# Patient Record
Sex: FEMALE | Race: WHITE | NOT HISPANIC OR LATINO
[De-identification: names, ages, dates, MRNs, and addresses within clinical notes are randomized per-mention and may not be internally consistent; named-entity substitution may affect disease eponyms.]

---

## 2017-01-05 ENCOUNTER — APPOINTMENT (OUTPATIENT)
Dept: OPHTHALMOLOGY | Facility: CLINIC | Age: 82
End: 2017-01-05

## 2017-02-07 ENCOUNTER — APPOINTMENT (OUTPATIENT)
Dept: OPHTHALMOLOGY | Facility: CLINIC | Age: 82
End: 2017-02-07

## 2017-06-22 ENCOUNTER — APPOINTMENT (OUTPATIENT)
Dept: OPHTHALMOLOGY | Facility: CLINIC | Age: 82
End: 2017-06-22

## 2017-06-22 DIAGNOSIS — H43.393 OTHER VITREOUS OPACITIES, BILATERAL: ICD-10-CM

## 2017-07-12 ENCOUNTER — APPOINTMENT (OUTPATIENT)
Dept: OPHTHALMOLOGY | Facility: CLINIC | Age: 82
End: 2017-07-12

## 2017-07-12 DIAGNOSIS — H34.8322 TRIBUTARY (BRANCH) RETINAL VEIN OCCLUSION, LEFT EYE, STABLE: ICD-10-CM

## 2017-07-12 DIAGNOSIS — H01.003 UNSPECIFIED BLEPHARITIS RIGHT EYE, UNSPECIFIED EYELID: ICD-10-CM

## 2017-07-12 DIAGNOSIS — H01.006 UNSPECIFIED BLEPHARITIS RIGHT EYE, UNSPECIFIED EYELID: ICD-10-CM

## 2017-09-13 ENCOUNTER — APPOINTMENT (OUTPATIENT)
Dept: OPHTHALMOLOGY | Facility: CLINIC | Age: 82
End: 2017-09-13

## 2017-11-30 ENCOUNTER — APPOINTMENT (OUTPATIENT)
Dept: OPHTHALMOLOGY | Facility: CLINIC | Age: 82
End: 2017-11-30
Payer: MEDICARE

## 2017-11-30 DIAGNOSIS — H35.363 DRUSEN (DEGENERATIVE) OF MACULA, BILATERAL: ICD-10-CM

## 2017-11-30 DIAGNOSIS — H34.8320 TRIBUTARY (BRANCH) RETINAL VEIN OCCLUSION, LEFT EYE, WITH MACULAR EDEMA: ICD-10-CM

## 2017-11-30 DIAGNOSIS — H35.352 CYSTOID MACULAR DEGENERATION, LEFT EYE: ICD-10-CM

## 2017-11-30 PROCEDURE — 92226: CPT | Mod: RT

## 2017-11-30 PROCEDURE — 92134 CPTRZ OPH DX IMG PST SGM RTA: CPT

## 2017-11-30 PROCEDURE — 92014 COMPRE OPH EXAM EST PT 1/>: CPT

## 2018-01-02 ENCOUNTER — APPOINTMENT (OUTPATIENT)
Dept: OPHTHALMOLOGY | Facility: CLINIC | Age: 83
End: 2018-01-02

## 2018-06-14 ENCOUNTER — APPOINTMENT (OUTPATIENT)
Dept: OPHTHALMOLOGY | Facility: CLINIC | Age: 83
End: 2018-06-14

## 2018-07-11 ENCOUNTER — APPOINTMENT (OUTPATIENT)
Dept: OPHTHALMOLOGY | Facility: CLINIC | Age: 83
End: 2018-07-11
Payer: MEDICARE

## 2018-07-11 DIAGNOSIS — H11.159 PINGUECULA, UNSPECIFIED EYE: ICD-10-CM

## 2018-07-11 DIAGNOSIS — H02.89 OTHER SPECIFIED DISORDERS OF EYELID: ICD-10-CM

## 2018-07-11 DIAGNOSIS — H04.129 DRY EYE SYNDROME OF UNSPECIFIED LACRIMAL GLAND: ICD-10-CM

## 2018-07-11 DIAGNOSIS — H43.813 VITREOUS DEGENERATION, BILATERAL: ICD-10-CM

## 2018-07-11 DIAGNOSIS — H35.30 UNSPECIFIED MACULAR DEGENERATION: ICD-10-CM

## 2018-07-11 DIAGNOSIS — Z96.1 PRESENCE OF INTRAOCULAR LENS: ICD-10-CM

## 2018-07-11 DIAGNOSIS — H34.8322 TRIBUTARY (BRANCH) RETINAL VEIN OCCLUSION, LEFT EYE, STABLE: ICD-10-CM

## 2018-07-11 PROCEDURE — 92012 INTRM OPH EXAM EST PATIENT: CPT

## 2018-08-12 ENCOUNTER — INPATIENT (INPATIENT)
Facility: HOSPITAL | Age: 83
LOS: 2 days | Discharge: NURSING, REHAB OR LONG-TERM CARE | DRG: 563 | End: 2018-08-15
Attending: INTERNAL MEDICINE | Admitting: INTERNAL MEDICINE
Payer: MEDICARE

## 2018-08-12 VITALS
RESPIRATION RATE: 16 BRPM | TEMPERATURE: 98 F | OXYGEN SATURATION: 98 % | SYSTOLIC BLOOD PRESSURE: 146 MMHG | HEART RATE: 61 BPM | DIASTOLIC BLOOD PRESSURE: 77 MMHG

## 2018-08-12 LAB
ALBUMIN SERPL ELPH-MCNC: 3.9 G/DL — SIGNIFICANT CHANGE UP (ref 3.3–5)
ALP SERPL-CCNC: 64 U/L — SIGNIFICANT CHANGE UP (ref 40–120)
ALT FLD-CCNC: 14 U/L — SIGNIFICANT CHANGE UP (ref 10–45)
ANION GAP SERPL CALC-SCNC: 16 MMOL/L — SIGNIFICANT CHANGE UP (ref 5–17)
AST SERPL-CCNC: 20 U/L — SIGNIFICANT CHANGE UP (ref 10–40)
BILIRUB SERPL-MCNC: 0.4 MG/DL — SIGNIFICANT CHANGE UP (ref 0.2–1.2)
BUN SERPL-MCNC: 17 MG/DL — SIGNIFICANT CHANGE UP (ref 7–23)
CALCIUM SERPL-MCNC: 9 MG/DL — SIGNIFICANT CHANGE UP (ref 8.4–10.5)
CHLORIDE SERPL-SCNC: 99 MMOL/L — SIGNIFICANT CHANGE UP (ref 96–108)
CO2 SERPL-SCNC: 23 MMOL/L — SIGNIFICANT CHANGE UP (ref 22–31)
CREAT SERPL-MCNC: 0.68 MG/DL — SIGNIFICANT CHANGE UP (ref 0.5–1.3)
GLUCOSE SERPL-MCNC: 161 MG/DL — HIGH (ref 70–99)
HCT VFR BLD CALC: 38.2 % — SIGNIFICANT CHANGE UP (ref 34.5–45)
HGB BLD-MCNC: 12.5 G/DL — SIGNIFICANT CHANGE UP (ref 11.5–15.5)
MCHC RBC-ENTMCNC: 30.6 PG — SIGNIFICANT CHANGE UP (ref 27–34)
MCHC RBC-ENTMCNC: 32.7 G/DL — SIGNIFICANT CHANGE UP (ref 32–36)
MCV RBC AUTO: 93.6 FL — SIGNIFICANT CHANGE UP (ref 80–100)
PLATELET # BLD AUTO: 270 K/UL — SIGNIFICANT CHANGE UP (ref 150–400)
POTASSIUM SERPL-MCNC: 4.2 MMOL/L — SIGNIFICANT CHANGE UP (ref 3.5–5.3)
POTASSIUM SERPL-SCNC: 4.2 MMOL/L — SIGNIFICANT CHANGE UP (ref 3.5–5.3)
PROT SERPL-MCNC: 6.6 G/DL — SIGNIFICANT CHANGE UP (ref 6–8.3)
RBC # BLD: 4.08 M/UL — SIGNIFICANT CHANGE UP (ref 3.8–5.2)
RBC # FLD: 14.1 % — SIGNIFICANT CHANGE UP (ref 10.3–16.9)
SODIUM SERPL-SCNC: 138 MMOL/L — SIGNIFICANT CHANGE UP (ref 135–145)
WBC # BLD: 11.4 K/UL — HIGH (ref 3.8–10.5)
WBC # FLD AUTO: 11.4 K/UL — HIGH (ref 3.8–10.5)

## 2018-08-12 PROCEDURE — 73060 X-RAY EXAM OF HUMERUS: CPT | Mod: 26,LT

## 2018-08-12 PROCEDURE — 72125 CT NECK SPINE W/O DYE: CPT | Mod: 26

## 2018-08-12 PROCEDURE — 99222 1ST HOSP IP/OBS MODERATE 55: CPT | Mod: GC

## 2018-08-12 PROCEDURE — 71045 X-RAY EXAM CHEST 1 VIEW: CPT | Mod: 26

## 2018-08-12 PROCEDURE — 99284 EMERGENCY DEPT VISIT MOD MDM: CPT

## 2018-08-12 PROCEDURE — 70450 CT HEAD/BRAIN W/O DYE: CPT | Mod: 26

## 2018-08-12 PROCEDURE — 73100 X-RAY EXAM OF WRIST: CPT | Mod: 26,RT

## 2018-08-12 PROCEDURE — 73030 X-RAY EXAM OF SHOULDER: CPT | Mod: 26,LT

## 2018-08-12 PROCEDURE — 72170 X-RAY EXAM OF PELVIS: CPT | Mod: 26

## 2018-08-12 RX ORDER — TETANUS TOXOID, REDUCED DIPHTHERIA TOXOID AND ACELLULAR PERTUSSIS VACCINE, ADSORBED 5; 2.5; 8; 8; 2.5 [IU]/.5ML; [IU]/.5ML; UG/.5ML; UG/.5ML; UG/.5ML
0.5 SUSPENSION INTRAMUSCULAR ONCE
Qty: 0 | Refills: 0 | Status: COMPLETED | OUTPATIENT
Start: 2018-08-12 | End: 2018-08-12

## 2018-08-12 RX ORDER — BACITRACIN ZINC 500 UNIT/G
1 OINTMENT IN PACKET (EA) TOPICAL ONCE
Qty: 0 | Refills: 0 | Status: COMPLETED | OUTPATIENT
Start: 2018-08-12 | End: 2018-08-12

## 2018-08-12 RX ORDER — ACETAMINOPHEN 500 MG
650 TABLET ORAL ONCE
Qty: 0 | Refills: 0 | Status: DISCONTINUED | OUTPATIENT
Start: 2018-08-12 | End: 2018-08-13

## 2018-08-12 RX ADMIN — TETANUS TOXOID, REDUCED DIPHTHERIA TOXOID AND ACELLULAR PERTUSSIS VACCINE, ADSORBED 0.5 MILLILITER(S): 5; 2.5; 8; 8; 2.5 SUSPENSION INTRAMUSCULAR at 21:39

## 2018-08-12 RX ADMIN — Medication 1 APPLICATION(S): at 21:39

## 2018-08-12 NOTE — ED ADULT TRIAGE NOTE - OTHER COMPLAINTS
pt reports walking up the stairs at [employer] when she tripped and fell. pt presents to ed with bruising to L cheek, L foot pain, L arm/shoulder pain and R hand abrasion. denies taking any daily meds.

## 2018-08-12 NOTE — ED ADULT NURSE NOTE - CHPI ED NUR SYMPTOMS NEG
no weakness/no confusion/no loss of consciousness/no fever/no tingling/no vomiting/no deformity/no numbness

## 2018-08-12 NOTE — ED PROVIDER NOTE - MEDICAL DECISION MAKING DETAILS
89F lives alone at home s/p mechanical fall on escalator presenting with L shoulder pain, pt with humerus fracture, usually walks with cane, unable to use cane at home and take care of ADLs, Some facial injury no ha/vomiting/loc, pt with neg head/cervical spine ct. will admit to medicine for further w/u

## 2018-08-12 NOTE — ED PROVIDER NOTE - PMH
Macular degeneration of both eyes, unspecified type    Malignant neoplasm of left female breast, unspecified estrogen receptor status, unspecified site of breast

## 2018-08-12 NOTE — ED PROVIDER NOTE - CARE PLAN
Principal Discharge DX:	Other closed nondisplaced fracture of proximal end of left humerus, initial encounter

## 2018-08-12 NOTE — ED ADULT NURSE NOTE - NSIMPLEMENTINTERV_GEN_ALL_ED
Implemented All Fall with Harm Risk Interventions:  [place] to call system. Call bell, personal items and telephone within reach. Instruct patient to call for assistance. Room bathroom lighting operational. Non-slip footwear when patient is off stretcher. Physically safe environment: no spills, clutter or unnecessary equipment. Stretcher in lowest position, wheels locked, appropriate side rails in place. Provide visual cue, wrist band, yellow gown, etc. Monitor gait and stability. Monitor for mental status changes and reorient to person, place, and time. Review medications for side effects contributing to fall risk. Reinforce activity limits and safety measures with patient and family. Provide visual clues: red socks.

## 2018-08-12 NOTE — ED ADULT NURSE NOTE - OBJECTIVE STATEMENT
89 year old female patient, A+OX3, ambulatory w walker with c/o of fall.  Pt states she was in [employer] when she was picking her walker up to go up the stairs and fell.  C/o of L elbow/shoulder pain, arm immbolized w sling, and R hand pain where there is a skin tear noted.  Denies head trauma.  Denies pmhx, denies daily medication, denies dizziness, loc.  Denies blood thinner usage.  No distress noted.

## 2018-08-12 NOTE — ED PROVIDER NOTE - OBJECTIVE STATEMENT
89 F s/p fall at [facility] on escalator. Some facial injury bruising/injury, no LOC, no preceding signs/symptoms. No 89 F s/p fall at [employer] on escalator. Some facial injury bruising/injury, no LOC, no preceding signs/symptoms. No headache, no vomiting, Pt also with L shoulder pain.

## 2018-08-13 DIAGNOSIS — Z91.89 OTHER SPECIFIED PERSONAL RISK FACTORS, NOT ELSEWHERE CLASSIFIED: ICD-10-CM
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
## 2018-08-13 DIAGNOSIS — R32 UNSPECIFIED URINARY INCONTINENCE: ICD-10-CM

## 2018-08-13 DIAGNOSIS — D72.828 OTHER ELEVATED WHITE BLOOD CELL COUNT: ICD-10-CM

## 2018-08-13 DIAGNOSIS — Z98.890 OTHER SPECIFIED POSTPROCEDURAL STATES: Chronic | ICD-10-CM

## 2018-08-13 DIAGNOSIS — F03.90 UNSPECIFIED DEMENTIA WITHOUT BEHAVIORAL DISTURBANCE: ICD-10-CM

## 2018-08-13 DIAGNOSIS — H35.30 UNSPECIFIED MACULAR DEGENERATION: ICD-10-CM

## 2018-08-13 DIAGNOSIS — R73.9 HYPERGLYCEMIA, UNSPECIFIED: ICD-10-CM

## 2018-08-13 DIAGNOSIS — Z29.9 ENCOUNTER FOR PROPHYLACTIC MEASURES, UNSPECIFIED: ICD-10-CM

## 2018-08-13 DIAGNOSIS — S42.302A UNSPECIFIED FRACTURE OF SHAFT OF HUMERUS, LEFT ARM, INITIAL ENCOUNTER FOR CLOSED FRACTURE: ICD-10-CM

## 2018-08-13 LAB
24R-OH-CALCIDIOL SERPL-MCNC: 32.5 NG/ML — SIGNIFICANT CHANGE UP (ref 30–80)
ANION GAP SERPL CALC-SCNC: 12 MMOL/L — SIGNIFICANT CHANGE UP (ref 5–17)
BLD GP AB SCN SERPL QL: NEGATIVE — SIGNIFICANT CHANGE UP
BUN SERPL-MCNC: 18 MG/DL — SIGNIFICANT CHANGE UP (ref 7–23)
CALCIUM SERPL-MCNC: 8.6 MG/DL — SIGNIFICANT CHANGE UP (ref 8.4–10.5)
CHLORIDE SERPL-SCNC: 102 MMOL/L — SIGNIFICANT CHANGE UP (ref 96–108)
CO2 SERPL-SCNC: 27 MMOL/L — SIGNIFICANT CHANGE UP (ref 22–31)
CREAT SERPL-MCNC: 0.65 MG/DL — SIGNIFICANT CHANGE UP (ref 0.5–1.3)
GLUCOSE BLDC GLUCOMTR-MCNC: 109 MG/DL — HIGH (ref 70–99)
GLUCOSE BLDC GLUCOMTR-MCNC: 122 MG/DL — HIGH (ref 70–99)
GLUCOSE BLDC GLUCOMTR-MCNC: 123 MG/DL — HIGH (ref 70–99)
GLUCOSE BLDC GLUCOMTR-MCNC: 156 MG/DL — HIGH (ref 70–99)
GLUCOSE SERPL-MCNC: 118 MG/DL — HIGH (ref 70–99)
HBA1C BLD-MCNC: 6.1 % — HIGH (ref 4–5.6)
HCT VFR BLD CALC: 36.6 % — SIGNIFICANT CHANGE UP (ref 34.5–45)
HGB BLD-MCNC: 11.6 G/DL — SIGNIFICANT CHANGE UP (ref 11.5–15.5)
MAGNESIUM SERPL-MCNC: 2.2 MG/DL — SIGNIFICANT CHANGE UP (ref 1.6–2.6)
MCHC RBC-ENTMCNC: 30.5 PG — SIGNIFICANT CHANGE UP (ref 27–34)
MCHC RBC-ENTMCNC: 31.7 G/DL — LOW (ref 32–36)
MCV RBC AUTO: 96.3 FL — SIGNIFICANT CHANGE UP (ref 80–100)
PLATELET # BLD AUTO: 252 K/UL — SIGNIFICANT CHANGE UP (ref 150–400)
POTASSIUM SERPL-MCNC: 4 MMOL/L — SIGNIFICANT CHANGE UP (ref 3.5–5.3)
POTASSIUM SERPL-SCNC: 4 MMOL/L — SIGNIFICANT CHANGE UP (ref 3.5–5.3)
RBC # BLD: 3.8 M/UL — SIGNIFICANT CHANGE UP (ref 3.8–5.2)
RBC # FLD: 14.5 % — SIGNIFICANT CHANGE UP (ref 10.3–16.9)
RH IG SCN BLD-IMP: POSITIVE — SIGNIFICANT CHANGE UP
SODIUM SERPL-SCNC: 141 MMOL/L — SIGNIFICANT CHANGE UP (ref 135–145)
TSH SERPL-MCNC: 0.4 UIU/ML — SIGNIFICANT CHANGE UP (ref 0.35–4.94)
VIT B12 SERPL-MCNC: 725 PG/ML — SIGNIFICANT CHANGE UP (ref 232–1245)
WBC # BLD: 8.5 K/UL — SIGNIFICANT CHANGE UP (ref 3.8–10.5)
WBC # FLD AUTO: 8.5 K/UL — SIGNIFICANT CHANGE UP (ref 3.8–10.5)

## 2018-08-13 PROCEDURE — 99232 SBSQ HOSP IP/OBS MODERATE 35: CPT | Mod: GC

## 2018-08-13 RX ORDER — GLUCAGON INJECTION, SOLUTION 0.5 MG/.1ML
1 INJECTION, SOLUTION SUBCUTANEOUS ONCE
Qty: 0 | Refills: 0 | Status: DISCONTINUED | OUTPATIENT
Start: 2018-08-13 | End: 2018-08-15

## 2018-08-13 RX ORDER — DEXTROSE 50 % IN WATER 50 %
25 SYRINGE (ML) INTRAVENOUS ONCE
Qty: 0 | Refills: 0 | Status: DISCONTINUED | OUTPATIENT
Start: 2018-08-13 | End: 2018-08-15

## 2018-08-13 RX ORDER — HEPARIN SODIUM 5000 [USP'U]/ML
5000 INJECTION INTRAVENOUS; SUBCUTANEOUS EVERY 12 HOURS
Qty: 0 | Refills: 0 | Status: DISCONTINUED | OUTPATIENT
Start: 2018-08-13 | End: 2018-08-15

## 2018-08-13 RX ORDER — DEXTROSE 50 % IN WATER 50 %
15 SYRINGE (ML) INTRAVENOUS ONCE
Qty: 0 | Refills: 0 | Status: DISCONTINUED | OUTPATIENT
Start: 2018-08-13 | End: 2018-08-15

## 2018-08-13 RX ORDER — ACETAMINOPHEN 500 MG
650 TABLET ORAL EVERY 6 HOURS
Qty: 0 | Refills: 0 | Status: DISCONTINUED | OUTPATIENT
Start: 2018-08-13 | End: 2018-08-15

## 2018-08-13 RX ORDER — INSULIN LISPRO 100/ML
VIAL (ML) SUBCUTANEOUS
Qty: 0 | Refills: 0 | Status: DISCONTINUED | OUTPATIENT
Start: 2018-08-13 | End: 2018-08-14

## 2018-08-13 RX ORDER — SODIUM CHLORIDE 9 MG/ML
1000 INJECTION, SOLUTION INTRAVENOUS
Qty: 0 | Refills: 0 | Status: DISCONTINUED | OUTPATIENT
Start: 2018-08-13 | End: 2018-08-15

## 2018-08-13 RX ORDER — DEXTROSE 50 % IN WATER 50 %
12.5 SYRINGE (ML) INTRAVENOUS ONCE
Qty: 0 | Refills: 0 | Status: DISCONTINUED | OUTPATIENT
Start: 2018-08-13 | End: 2018-08-15

## 2018-08-13 RX ADMIN — HEPARIN SODIUM 5000 UNIT(S): 5000 INJECTION INTRAVENOUS; SUBCUTANEOUS at 06:33

## 2018-08-13 RX ADMIN — Medication 1 DROP(S): at 12:20

## 2018-08-13 RX ADMIN — HEPARIN SODIUM 5000 UNIT(S): 5000 INJECTION INTRAVENOUS; SUBCUTANEOUS at 18:03

## 2018-08-13 RX ADMIN — Medication 2: at 12:19

## 2018-08-13 NOTE — H&P ADULT - NSHPPHYSICALEXAM_GEN_ALL_CORE
.  VITAL SIGNS:  T(F): 97.8 (08-13-18 @ 00:50), Max: 98.2 (08-12-18 @ 18:13)  HR: 61 (08-13-18 @ 00:50) (61 - 62)  BP: 120/56 (08-13-18 @ 00:50) (118/68 - 177/63)  BP(mean): --  RR: 14 (08-13-18 @ 00:50) (14 - 16)  SpO2: 97% (08-13-18 @ 00:50) (94% - 98%)    PHYSICAL EXAM:    Constitutional: WDWN resting comfortably in bed; NAD, poor historian and forgetful   HEENT: abrasions over left face, pupils constricted bilaterally and minimally reactive, EOMI, anicteric sclera, no nasal discharge; uvula midline, no oropharyngeal erythema or exudates; MMM  Neck: supple; no JVD or thyromegaly  Respiratory: CTA B/L; no W/R/R, no retractions  Cardiac: +S1/S2; RRR; no M/R/G; PMI non-displaced  Gastrointestinal: soft, NT/ND; no rebound or guarding; +BSx4  Back: spine midline, no bony tenderness or step-offs; no CVAT B/L  Extremities: WWP, no clubbing or cyanosis; no peripheral edema  Musculoskeletal: Left arm sling in place, ROM limited to pain. Right hand wrapped with underlying skin tear, left leg with wrapped skin tear c/d/i   Vascular: 2+ radial, femoral, DP/PT pulses B/L  Lymphatic: no submandibular or cervical LAD  Neurologic: AAOx2; CNII-XII grossly intact; no focal deficits. Unable to assess left arm strength 2/2 sling in place.

## 2018-08-13 NOTE — PROGRESS NOTE ADULT - PROBLEM SELECTOR PROBLEM 2
Closed fracture of shaft of left humerus, unspecified fracture morphology, initial encounter Fall (on) (from) other stairs and steps, initial encounter

## 2018-08-13 NOTE — H&P ADULT - HISTORY OF PRESENT ILLNESS
Patient is a pleasant 89 yo F w/PMH breast CA s/p left breast lumpectomy ~8 years ago, age-related macular degeneration, undiagnosed but likely dementia presented to ED [facility] s/p witnessed fall when exiting escalator at [facility] [employer] earlier in day. Patient states she was exiting the escalator with her walker when she tripped and fell flat on her face, hitting her nose, bilateral knees, causing bruising to her hands. She denies preceding CP, SOB, light headedness, palpitations, n/v, weakness, LOC. After the fall patient states she experienced pain in her left knee, hip, and arm and had ~10 consecutive episodes of small volume vomiting, no preceding nausea, this has since resolved. She states this is the first time she ever fell. She does not take any medications at home. She lives at home alone with HHA 5/7 days of the week, 7 hours/day but HHA normally leaves early before her shift ends.  Patient is a poor historian, has preserved short term recall (today's events are clear) but cannot recall names of close family members, PMD, etc.     In ED: VS Patient is a pleasant 89 yo F w/PMH breast CA s/p left breast lumpectomy ~8 years ago, age-related macular degeneration, undiagnosed but likely dementia presented to ED [facility] s/p witnessed fall when exiting escalator at [place] [facility] [employer] earlier in day. Patient states she was exiting the escalator with her walker when she tripped and fell flat on her face, hitting her nose, bilateral knees, causing bruising to her hands. She denies preceding CP, SOB, light headedness, palpitations, n/v, weakness, LOC. After the fall patient states she experienced pain in her left knee, hip, and arm and had ~10 consecutive episodes of small volume vomiting, no preceding nausea, this has since resolved. She states this is the first time she ever fell. She does not take any medications at home. She lives at home alone with HHA 5/7 days of the week, 7 hours/day but HHA normally leaves early before her shift ends.  Patient is a poor historian, has preserved short term recall (today's events are clear) but cannot recall names of close family members, PMD, etc.  ROS + for urinary incontinence, worsening over past few weeks, also with abnormal gait for which patient uses walker.     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  CT C-spine w/out acute pathology. XRAY pelvis wet read: no fracture, pending official read.   XRAY shoulder and L humerus with acute minimally displaced and impacted left humeral fracture.    Patient given left shoulder sling, tylenol for pain, Tdap vaccine, wound dressed and patient admitted to medicine for PT eval since cannot ambulate independent of walker.

## 2018-08-13 NOTE — PHYSICAL THERAPY INITIAL EVALUATION ADULT - IMPAIRMENTS FOUND, PT EVAL
poor safety awareness/muscle strength/gross motor/gait, locomotion, and balance/aerobic capacity/endurance

## 2018-08-13 NOTE — PROGRESS NOTE ADULT - PROBLEM SELECTOR PLAN 4
No signs of infection besides mild WBC 11.4. Possibly reactive in setting of fracture.  - repeat CBC in AM showed WBC 8.5    #incidental finding thyroid nodule- 2 cm inferior thyroid lobe  - tsh in AM

## 2018-08-13 NOTE — CONSULT NOTE ADULT - SUBJECTIVE AND OBJECTIVE BOX
Orthopaedic Consult Note    HPI: 89 y/o Female PMH (undiagnosed) dementia, age-related macular degeneration, breast CA s/p lumpectomy ~8 years ago BIBEM[facility] s/p witnessed mechanical fall yesterday. Pt is a poor historian - history obtained from pt and HHA at bedside. Per HHA, pt fell getting off the escalator at [facility] yesterday onto her face hitting her nose and bruising bilateral wrists. Pt denies LOC. Pt denies preceding CP, SOB, palpitations. Pt endorses left arm pain following the fall yesterday in addition to multiple episodes of small volume emesis - since resolved. Pt is left hand dominant. Pt states this is the first time she ever fell. Pt lives at home alone with HHA 5/7 days of the week, 7 hours/day but HHA normally leaves early before her shift ends. Pt ambulates with walker at baseline. ROS+ chronic urinary incontinence, worsening recently.       PAST MEDICAL & SURGICAL HISTORY:  Macular degeneration of both eyes, unspecified type  Malignant neoplasm of left female breast, unspecified estrogen receptor status, unspecified site of breast  H/O laminectomy  History of lumpectomy of left breast    Allergies  No Known Allergies      MEDICATIONS  (STANDING):  artificial  tears Solution 1 Drop(s) Both EYES daily  dextrose 5%. 1000 milliLiter(s) (50 mL/Hr) IV Continuous <Continuous>  dextrose 50% Injectable 12.5 Gram(s) IV Push once  dextrose 50% Injectable 25 Gram(s) IV Push once  dextrose 50% Injectable 25 Gram(s) IV Push once  heparin  Injectable 5000 Unit(s) SubCutaneous every 12 hours  insulin lispro (HumaLOG) corrective regimen sliding scale   SubCutaneous Before meals and at bedtime      Vital Signs Last 24 Hrs  T(C): 36.9 (13 Aug 2018 09:00), Max: 36.9 (13 Aug 2018 09:00)  T(F): 98.4 (13 Aug 2018 09:00), Max: 98.4 (13 Aug 2018 09:00)  HR: 59 (13 Aug 2018 09:00) (59 - 62)  BP: 135/72 (13 Aug 2018 09:00) (118/68 - 177/63)  BP(mean): --  RR: 18 (13 Aug 2018 09:00) (14 - 18)  SpO2: 94% (13 Aug 2018 09:00) (94% - 98%)    Physical Exam: 89F resting comfortably in bed in NAD. Abrasions over left face.  Left upper extremity in sling. +ttp left shoulder  +decreased ROM secondary to pain/fracture, left shoulder  Freely wiggling all fingers, wrist flexion/extension intact   strength 4-/5 bilaterally -- pt reports consistent with her baseline  Motor exam bicep/tricep not assessed 2/2 pt refusal  Sensation intact and equal to light touch bilateral upper extremities  Fingers warm and well perfused, cap refill brisk, radial pulses 2+ bilaterally                              11.6   8.5   )-----------( 252      ( 13 Aug 2018 07:31 )             36.6     08-13    141  |  102  |  18  ----------------------------<  118<H>  4.0   |  27  |  0.65    Ca    8.6      13 Aug 2018 07:31  Mg     2.2     08-13    TPro  6.6  /  Alb  3.9  /  TBili  0.4  /  DBili  x   /  AST  20  /  ALT  14  /  AlkPhos  64  08-12    Imaging: X-ray Left Humerus/Shoulder: Acute minimally displaced and impacted left humeral fracture.   X-ray Pelvis: No fracture identified. Osteoarthritis. Multi-level degenerative disease of the spine.  X-ray Right Wrist: No fracture identified. Osteopenia / chondrocalcinosis.  CT C-spine: Status post C5-C6 laminectomy. No acute osseous injury identified.    A/P: 89yFemale w/ left humeral neck fracture, impacted/minimally displaced  NWB left upper extremity in sling  Pain control  PT  No acute orthopedic intervention indicated at this time  Follow up outpatient with Dr. Terrell upon discharge  DVT prophylaxis per primary team - [facility]  Discussed case with Dr. Terrell Orthopaedic Consult Note    HPI: 89 y/o Female PMH (undiagnosed) dementia, age-related macular degeneration, breast CA s/p lumpectomy ~8 years ago [facility] s/p witnessed mechanical fall yesterday. Pt is a poor historian - history obtained from pt and HHA at bedside. Per HHA, pt fell getting off the escalator at [employer] yesterday onto her face hitting her nose and bruising bilateral wrists. Pt denies LOC. Pt denies preceding CP, SOB, palpitations. Pt endorses left arm pain following the fall yesterday in addition to multiple episodes of small volume emesis - since resolved. Pt is left hand dominant. Pt states this is the first time she ever fell. Pt lives at home alone with HHA 5/7 days of the week, 7 hours/day but HHA normally leaves early before her shift ends. Pt ambulates with walker at baseline. ROS+ chronic urinary incontinence, worsening recently.       PAST MEDICAL & SURGICAL HISTORY:  Macular degeneration of both eyes, unspecified type  Malignant neoplasm of left female breast, unspecified estrogen receptor status, unspecified site of breast  H/O laminectomy  History of lumpectomy of left breast    Allergies  No Known Allergies      MEDICATIONS  (STANDING):  artificial  tears Solution 1 Drop(s) Both EYES daily  dextrose 5%. 1000 milliLiter(s) (50 mL/Hr) IV Continuous <Continuous>  dextrose 50% Injectable 12.5 Gram(s) IV Push once  dextrose 50% Injectable 25 Gram(s) IV Push once  dextrose 50% Injectable 25 Gram(s) IV Push once  heparin  Injectable 5000 Unit(s) SubCutaneous every 12 hours  insulin lispro (HumaLOG) corrective regimen sliding scale   SubCutaneous Before meals and at bedtime      Vital Signs Last 24 Hrs  T(C): 36.9 (13 Aug 2018 09:00), Max: 36.9 (13 Aug 2018 09:00)  T(F): 98.4 (13 Aug 2018 09:00), Max: 98.4 (13 Aug 2018 09:00)  HR: 59 (13 Aug 2018 09:00) (59 - 62)  BP: 135/72 (13 Aug 2018 09:00) (118/68 - 177/63)  BP(mean): --  RR: 18 (13 Aug 2018 09:00) (14 - 18)  SpO2: 94% (13 Aug 2018 09:00) (94% - 98%)    Physical Exam: 89F resting comfortably in bed in NAD. Abrasions over left face.  Left upper extremity in sling. +ttp left shoulder. Right hand wrapped in Kerlix - c/d/i  +decreased ROM secondary to pain/fracture, left shoulder  Freely wiggling all fingers, wrist flexion/extension intact   strength 4-/5 bilaterally -- pt reports consistent with her baseline  Motor exam bicep/tricep not assessed 2/2 pt refusal  Sensation intact and equal to light touch bilateral upper extremities  Fingers warm and well perfused, cap refill brisk, radial pulses 2+ bilaterally                              11.6   8.5   )-----------( 252      ( 13 Aug 2018 07:31 )             36.6     08-13    141  |  102  |  18  ----------------------------<  118<H>  4.0   |  27  |  0.65    Ca    8.6      13 Aug 2018 07:31  Mg     2.2     08-13    TPro  6.6  /  Alb  3.9  /  TBili  0.4  /  DBili  x   /  AST  20  /  ALT  14  /  AlkPhos  64  08-12    Imaging: X-ray Left Humerus/Shoulder: Acute minimally displaced and impacted left humeral fracture.   X-ray Pelvis: No fracture identified. Osteoarthritis. Multi-level degenerative disease of the spine.  X-ray Right Wrist: No fracture identified. Osteopenia / chondrocalcinosis.  CT C-spine: Status post C5-C6 laminectomy. No acute osseous injury identified.    A/P: 89yFemale w/ left humeral neck fracture, impacted/minimally displaced  NWB left upper extremity in sling  Pain control  PT  No acute orthopedic intervention indicated at this time  DVT prophylaxis per primary team - [facility]  Neuro c/s as per primary team  Follow up outpatient with Dr. Terrell upon discharge  Discussed case with Dr. Terrell

## 2018-08-13 NOTE — PROGRESS NOTE ADULT - SUBJECTIVE AND OBJECTIVE BOX
Patient is a pleasant 89 yo F w/PMH breast CA s/p left breast lumpectomy ~8 years ago, age-related macular degeneration, undiagnosed but likely dementia presented to ED [facility] s/p witnessed fall when exiting escalator at [facility] [employer] earlier in day. Patient states she was exiting the escalator with her walker when she tripped and fell flat on her face, hitting her nose, bilateral knees, causing bruising to her hands. She denies preceding CP, SOB, light headedness, palpitations, n/v, weakness, LOC. After the fall patient states she experienced pain in her left knee, hip, and arm and had ~10 consecutive episodes of small volume vomiting, no preceding nausea, this has since resolved. She states this is the first time she ever fell. She does not take any medications at home. She lives at home alone with HHA 5/7 days of the week, 7 hours/day but HHA normally leaves early before her shift ends.  Patient is a poor historian, has preserved short term recall (today's events are clear) but cannot recall names of close family members, PMD, etc.  ROS + for urinary incontinence, worsening over past few weeks, also with abnormal gait for which patient uses walker.     OVERNIGHT EVENTS:    SUBJECTIVE / INTERVAL HPI: Patient seen and examined at bedside.     VITAL SIGNS:  Vital Signs Last 24 Hrs  T(C): 36.9 (13 Aug 2018 09:00), Max: 36.9 (13 Aug 2018 09:00)  T(F): 98.4 (13 Aug 2018 09:00), Max: 98.4 (13 Aug 2018 09:00)  HR: 59 (13 Aug 2018 09:00) (59 - 62)  BP: 135/72 (13 Aug 2018 09:00) (118/68 - 177/63)  BP(mean): --  RR: 18 (13 Aug 2018 09:00) (14 - 18)  SpO2: 94% (13 Aug 2018 09:00) (94% - 98%)    PHYSICAL EXAM:    General: NAD  HEENT: NC/AT; PERRL, anicteric sclera; MMM  Neck: supple  Cardiovascular: +S1/S2, RRR  Respiratory: CTA B/L; no W/R/R, no crackles  Gastrointestinal: soft, NT/ND; +BSx4  Extremities: warm, well perfused; no edema, clubbing or cyanosis  Vascular: 2+ radial, DP/PT pulses B/L  Neurological: AAOx3; no focal deficits    MEDICATIONS:  MEDICATIONS  (STANDING):  artificial  tears Solution 1 Drop(s) Both EYES daily  dextrose 5%. 1000 milliLiter(s) (50 mL/Hr) IV Continuous <Continuous>  dextrose 50% Injectable 12.5 Gram(s) IV Push once  dextrose 50% Injectable 25 Gram(s) IV Push once  dextrose 50% Injectable 25 Gram(s) IV Push once  heparin  Injectable 5000 Unit(s) SubCutaneous every 12 hours  insulin lispro (HumaLOG) corrective regimen sliding scale   SubCutaneous Before meals and at bedtime    MEDICATIONS  (PRN):  acetaminophen   Tablet. 650 milliGRAM(s) Oral every 6 hours PRN Moderate Pain (4 - 6)  dextrose 40% Gel 15 Gram(s) Oral once PRN Blood Glucose LESS THAN 70 milliGRAM(s)/deciliter  glucagon  Injectable 1 milliGRAM(s) IntraMuscular once PRN Glucose LESS THAN 70 milligrams/deciliter      ALLERGIES:  Allergies    No Known Allergies    Intolerances        LABS:                        11.6   8.5   )-----------( 252      ( 13 Aug 2018 07:31 )             36.6     08-13    141  |  102  |  18  ----------------------------<  118<H>  4.0   |  27  |  0.65    Ca    8.6      13 Aug 2018 07:31  Mg     2.2     08-13    TPro  6.6  /  Alb  3.9  /  TBili  0.4  /  DBili  x   /  AST  20  /  ALT  14  /  AlkPhos  64  08-12        CAPILLARY BLOOD GLUCOSE      POCT Blood Glucose.: 109 mg/dL (13 Aug 2018 08:21)      RADIOLOGY & ADDITIONAL TESTS: Reviewed. Patient is a pleasant 89 yo F w/PMH breast CA s/p left breast lumpectomy ~8 years ago, age-related macular degeneration, undiagnosed but likely dementia presented to ED [facility] s/p witnessed fall when exiting escalator at [place] [facility] [place] earlier in day. Patient states she was exiting the escalator with her walker when she tripped and fell flat on her face, hitting her nose, bilateral knees, causing bruising to her hands. She denies preceding CP, SOB, light headedness, palpitations, n/v, weakness, LOC. After the fall patient states she experienced pain in her left knee, hip, and arm and had ~10 consecutive episodes of small volume vomiting, no preceding nausea, this has since resolved. She states this is the first time she ever fell. She does not take any medications at home. She lives at home alone with HHA 5/7 days of the week, 7 hours/day but HHA normally leaves early before her shift ends.  Patient is a poor historian, has preserved short term recall (today's events are clear) but cannot recall names of close family members, PMD, etc.  ROS + for urinary incontinence, worsening over past few weeks, also with abnormal gait for which patient uses walker.     OVERNIGHT EVENTS: no acute events overnight    SUBJECTIVE / INTERVAL HPI: Patient seen and examined at bedside. She is doing well, only pain with L arm movement. L arm in sling. She denies any fever, chills, SOB, chest pain, nausea, vomiting, headache, lightheadedness.    VITAL SIGNS:  Vital Signs Last 24 Hrs  T(C): 36.9 (13 Aug 2018 09:00), Max: 36.9 (13 Aug 2018 09:00)  T(F): 98.4 (13 Aug 2018 09:00), Max: 98.4 (13 Aug 2018 09:00)  HR: 59 (13 Aug 2018 09:00) (59 - 62)  BP: 135/72 (13 Aug 2018 09:00) (118/68 - 177/63)  BP(mean): --  RR: 18 (13 Aug 2018 09:00) (14 - 18)  SpO2: 94% (13 Aug 2018 09:00) (94% - 98%)    PHYSICAL EXAM:    General: NAD, multiple bruises and abrasions  HEENT: NC/AT; PERRL, anicteric sclera; MMM, facial abrasion left side of face  Neck: supple, no JVD  Cardiovascular: +S1/S2, RRR, no murmurs, rubs, gallops  Respiratory: CTA B/L; no W/R/R, no crackles  Gastrointestinal: soft, NT/ND; +BSx4  Extremities: warm, well perfused; no edema, clubbing or cyanosis, cuts on bilateral legs, bruises on right arm, left arm in sling  Vascular: 2+ radial, DP/PT pulses B/L  Neurological: AAOx2, knows self and that she is in a hospital, does not know the year. Is aware that her 89th birthday is next week and there is going to be a party. LUE immobile due to pain from humeral neck fracture, RUE strength and ROM intact    MEDICATIONS:  MEDICATIONS  (STANDING):  artificial  tears Solution 1 Drop(s) Both EYES daily  dextrose 5%. 1000 milliLiter(s) (50 mL/Hr) IV Continuous <Continuous>  dextrose 50% Injectable 12.5 Gram(s) IV Push once  dextrose 50% Injectable 25 Gram(s) IV Push once  dextrose 50% Injectable 25 Gram(s) IV Push once  heparin  Injectable 5000 Unit(s) SubCutaneous every 12 hours  insulin lispro (HumaLOG) corrective regimen sliding scale   SubCutaneous Before meals and at bedtime    MEDICATIONS  (PRN):  acetaminophen   Tablet. 650 milliGRAM(s) Oral every 6 hours PRN Moderate Pain (4 - 6)  dextrose 40% Gel 15 Gram(s) Oral once PRN Blood Glucose LESS THAN 70 milliGRAM(s)/deciliter  glucagon  Injectable 1 milliGRAM(s) IntraMuscular once PRN Glucose LESS THAN 70 milligrams/deciliter      ALLERGIES:  Allergies    No Known Allergies    Intolerances        LABS:                        11.6   8.5   )-----------( 252      ( 13 Aug 2018 07:31 )             36.6     08-13    141  |  102  |  18  ----------------------------<  118<H>  4.0   |  27  |  0.65    Ca    8.6      13 Aug 2018 07:31  Mg     2.2     08-13    TPro  6.6  /  Alb  3.9  /  TBili  0.4  /  DBili  x   /  AST  20  /  ALT  14  /  AlkPhos  64  08-12        CAPILLARY BLOOD GLUCOSE      POCT Blood Glucose.: 109 mg/dL (13 Aug 2018 08:21)      RADIOLOGY & ADDITIONAL TESTS: Reviewed.

## 2018-08-13 NOTE — PHYSICAL THERAPY INITIAL EVALUATION ADULT - GENERAL OBSERVATIONS, REHAB EVAL
Pt received supine in bed with +hep-lock, +left UE sling, NAD, family present, aide present. Pt left OOB sitting in the chair, call bell in reach, NAD, aide and family present, RN awares. Pt complains left shoulder pain during PT session 6/10 on pain scale.

## 2018-08-13 NOTE — H&P ADULT - PROBLEM SELECTOR PLAN 10
1) PCP Contacted on Admission: N: Patient cannot remember name of PMD.   2) Date of Contact with PCP:  3) PCP Contacted at Discharge: (Y/N, N/A)  4) Summary of Handoff Given to PCP:   5) Post-Discharge Appointment Date and Location:

## 2018-08-13 NOTE — H&P ADULT - PROBLEM SELECTOR PLAN 1
Mechanical fall from escalator stairs with no prodromal symptoms, EKG wnl. 1st fall, no LOC however did hit head. CT head negative for any acute findings, did show findings concerning for NPH. Mechanical fall from escalator stairs with no prodromal symptoms, EKG wnl. 1st fall, no LOC however did hit head. 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   - neuro consult in AM to evaluate for NPH given fall and walker dependence signifying ataxic gait, CT findings, dementia, urinary incontinence.   - PT in AM  - Fall precautions

## 2018-08-13 NOTE — PROGRESS NOTE ADULT - PROBLEM SELECTOR PLAN 2
Sling placed, usually would be discharged from ED however in the setting of relying on walker to ambulate, considered an unsafe discharge because could not ambulate with walker.  - ortho consult in AM--> no surgical intervention at this time, conservative treatment, nonweight bearing  - PT in AM  - Tylenol PRN Sling placed, usually would be discharged from ED however in the setting of relying on walker to ambulate, considered an unsafe discharge because could not ambulate with walker.  - ortho consult in AM--> no surgical intervention at this time, conservative treatment, nonweight bearing  - PT/OT in AM  - Tylenol PRN Mechanical fall from escalator stairs with no prodromal symptoms, EKG wnl. 1st fall, no LOC however did hit head. 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   - PT/OT in AM  - Fall precautions

## 2018-08-13 NOTE — PHYSICAL THERAPY INITIAL EVALUATION ADULT - PERTINENT HX OF CURRENT PROBLEM, REHAB EVAL
Pt is a 89 yo female admitted to [facility] s/p mechanical fall with left humerus fracture.

## 2018-08-13 NOTE — PHYSICAL THERAPY INITIAL EVALUATION ADULT - GAIT DEVIATIONS NOTED, PT EVAL
decreased weight-shifting ability/decreased domingo/decreased step length/unsteady gait, no lose of balance, decreased heel strike and hip flexion, decreased foot clearance from the floor during ambulation

## 2018-08-13 NOTE — PROGRESS NOTE ADULT - PROBLEM SELECTOR PLAN 1
Mechanical fall from escalator stairs with no prodromal symptoms, EKG wnl. 1st fall, no LOC however did hit head. 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   - PT/OT in AM  - Fall precautions Sling placed, usually would be discharged from ED however in the setting of relying on walker to ambulate, considered an unsafe discharge because could not ambulate with walker.  - ortho consult in AM--> no surgical intervention at this time, conservative treatment, nonweight bearing  - PT/OT in AM  - Tylenol PRN

## 2018-08-13 NOTE — H&P ADULT - NSHPSOCIALHISTORY_GEN_ALL_CORE
Lives alone. . No children. Has many cousins, closest relatives but she cannot remember specific names or contact numbers. HHA 7 hours day/ 5 days/week however HHA often leaves earlier. Walks with walker. Social alcohol "at dinner parties", no drugs, never smoker.

## 2018-08-13 NOTE — H&P ADULT - ASSESSMENT
89 yo F w/PMH breast CA s/p left breast lumpectomy ~8 years ago, age-related macular degeneration, undiagnosed but likely dementia presented to ED BIBEMS s/p witnessed fall with L. humeral fracture impeding ability to ambulated with walker. CT head with no acute pathology however showing prominent ventricles suggestive of NPH vs volume loss as well as  1.8 cm right frontal meningioma.

## 2018-08-13 NOTE — H&P ADULT - PROBLEM SELECTOR PLAN 5
Patient A&OX2, cannot recall long term details.   - neuro consult given CT findings concern for NPH.

## 2018-08-13 NOTE — H&P ADULT - PROBLEM SELECTOR PLAN 7
No reported history of DM II.  - f/u HbA1c in AM  - ISS for now, consider discontinuing if normal FS.

## 2018-08-13 NOTE — H&P ADULT - NSHPLABSRESULTS_GEN_ALL_CORE
.  LABS:                         12.5   11.4  )-----------( 270      ( 12 Aug 2018 22:21 )             38.2     08-12    138  |  99  |  17  ----------------------------<  161<H>  4.2   |  23  |  0.68    Ca    9.0      12 Aug 2018 22:21    TPro  6.6  /  Alb  3.9  /  TBili  0.4  /  DBili  x   /  AST  20  /  ALT  14  /  AlkPhos  64  08-12                  RADIOLOGY, EKG & ADDITIONAL TESTS:     EKG NSR HR 60's.     CT HEAD No Con  IMPRESSION:   1.  No intracranial hemorrhage or calvarial fracture.  2.  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  3.  1.8 cm right frontal meningioma, as described above.    CT C-SPINE No Con: Incidental finding of 2 cm inferior thyroid nodule   IMPRESSION:   Status post C5 and C6 laminectomies. No evidence of acute osseous injury   to the cervical spine.      XRAY Humerus, SHOULDER 2 view IMPRESSION: Acute minimally displaced and impacted left humeral fracture. .  LABS:                         12.5   11.4  )-----------( 270      ( 12 Aug 2018 22:21 )             38.2     08-12    138  |  99  |  17  ----------------------------<  161<H>  4.2   |  23  |  0.68    Ca    9.0      12 Aug 2018 22:21    TPro  6.6  /  Alb  3.9  /  TBili  0.4  /  DBili  x   /  AST  20  /  ALT  14  /  AlkPhos  64  08-12          RADIOLOGY, EKG & ADDITIONAL TESTS:     EKG NSR HR 60's.     CT HEAD No Con  IMPRESSION:   1.  No intracranial hemorrhage or calvarial fracture.  2.  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  3.  1.8 cm right frontal meningioma, as described above.    CT C-SPINE No Con: Incidental finding of 2 cm inferior thyroid nodule   IMPRESSION:   Status post C5 and C6 laminectomies. No evidence of acute osseous injury   to the cervical spine.      XRAY Humerus, SHOULDER 2 view IMPRESSION: Acute minimally displaced and impacted left humeral fracture.

## 2018-08-13 NOTE — CONSULT NOTE ADULT - SUBJECTIVE AND OBJECTIVE BOX
Patient is a 89y old  Female who presents with a chief complaint of s/p mechanical fall with L. humerus fracture impairing ability to use walker (13 Aug 2018 00:36)       HPI:  Patient is a pleasant 89 yo F w/PMH breast CA s/p left breast lumpectomy ~8 years ago, age-related macular degeneration, undiagnosed but likely dementia presented to ED [facility] s/p witnessed fall when exiting escalator at [employer] earlier in day. Patient states she was exiting the escalator with her walker when she tripped and fell flat on her face, hitting her nose, bilateral knees, causing bruising to her hands. She denies preceding CP, SOB, light headedness, palpitations, n/v, weakness, LOC. After the fall patient states she experienced pain in her left knee, hip, and arm and had ~10 consecutive episodes of small volume vomiting, no preceding nausea, this has since resolved. She states this is the first time she ever fell. She does not take any medications at home. She lives at home alone with A 5/7 days of the week, 7 hours/day but HHA normally leaves early before her shift ends.  Patient is a poor historian, has preserved short term recall (today's events are clear) but cannot recall names of close family members, PMD, etc.  ROS + for urinary incontinence, worsening over past few weeks, also with abnormal gait for which patient uses walker.     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  CT C-spine w/out acute pathology. XRAY pelvis wet read: no fracture, pending official read.   XRAY shoulder and L humerus with acute minimally displaced and impacted left humeral fracture.    Patient given left shoulder sling, tylenol for pain, Tdap vaccine, wound dressed and patient admitted to medicine for PT eval since cannot ambulate independent of walker. (13 Aug 2018 00:36)      PAST MEDICAL & SURGICAL HISTORY:  Macular degeneration of both eyes, unspecified type  Malignant neoplasm of left female breast, unspecified estrogen receptor status, unspecified site of breast  H/O laminectomy  History of lumpectomy of left breast      MEDICATIONS  (STANDING):  artificial  tears Solution 1 Drop(s) Both EYES daily  dextrose 5%. 1000 milliLiter(s) (50 mL/Hr) IV Continuous <Continuous>  dextrose 50% Injectable 12.5 Gram(s) IV Push once  dextrose 50% Injectable 25 Gram(s) IV Push once  dextrose 50% Injectable 25 Gram(s) IV Push once  heparin  Injectable 5000 Unit(s) SubCutaneous every 12 hours  insulin lispro (HumaLOG) corrective regimen sliding scale   SubCutaneous Before meals and at bedtime    MEDICATIONS  (PRN):  acetaminophen   Tablet. 650 milliGRAM(s) Oral every 6 hours PRN Moderate Pain (4 - 6)  dextrose 40% Gel 15 Gram(s) Oral once PRN Blood Glucose LESS THAN 70 milliGRAM(s)/deciliter  glucagon  Injectable 1 milliGRAM(s) IntraMuscular once PRN Glucose LESS THAN 70 milligrams/deciliter      Social History per patient: states that she is a  with no children, lives alone in an elevator accessible apartment building, has aide 5 days x 7 days    Functional Level Prior to Admission per patient: states that she needs assistance with bathing, walk with a walker    FAMILY HISTORY:  No family history of cancer (Father, Mother)      CBC Full  -  ( 13 Aug 2018 07:31 )  WBC Count : 8.5 K/uL  Hemoglobin : 11.6 g/dL  Hematocrit : 36.6 %  Platelet Count - Automated : 252 K/uL  Mean Cell Volume : 96.3 fL  Mean Cell Hemoglobin : 30.5 pg  Mean Cell Hemoglobin Concentration : 31.7 g/dL  Auto Neutrophil # : x  Auto Lymphocyte # : x  Auto Monocyte # : x  Auto Eosinophil # : x  Auto Basophil # : x  Auto Neutrophil % : x  Auto Lymphocyte % : x  Auto Monocyte % : x  Auto Eosinophil % : x  Auto Basophil % : x      08-13    141  |  102  |  18  ----------------------------<  118<H>  4.0   |  27  |  0.65    Ca    8.6      13 Aug 2018 07:31  Mg     2.2     08-13    TPro  6.6  /  Alb  3.9  /  TBili  0.4  /  DBili  x   /  AST  20  /  ALT  14  /  AlkPhos  64  08-12            Radiology:    < from: Xray Humerus, Left (08.12.18 @ 21:09) >  EXAM:  XR SHOULDER-LEFT MIN 2 VIEWS                          EXAM:  XR HUMERUS-LEFT MIN 2 VIEWS                          PROCEDURE DATE:  08/12/2018          INTERPRETATION:  XR HUMERUS-LEFT MIN 2 VIEWS, XR SHOULDER-LEFT MIN 2   VIEWS DATED 8/12/2018 9:09 PM    INDICATION: 89 years-old Female with fall.    PRIOR STUDIES: None    FINDINGS: 3 views of the left shoulder and 2 views of the left humerus   are submitted for review. Evaluation of the osseous structures   demonstrates a transverse oriented left humeral neck fracture which is   minimally displaced and impacted. Overlying soft tissue swelling is   noted. No additional acute fractures are identified.    The partially imaged left lung field is unremarkable. No rib fractures   are identified. No pneumothorax. A partially visualized left implantable   pacemaker is noted.    IMPRESSION: Acute minimally displaced and impacted left humeral fracture   as above.        < from: Xray Wrist 2 Views, Right (08.12.18 @ 21:08) >  EXAM:  XR WRIST-RIGHT-2 VIEWS                          PROCEDURE DATE:  08/12/2018          INTERPRETATION:  Indication: fall at home    2 views of the right wrist demonstrate osteopenia and chondrocalcinosis.   No fracture is identified.      Impression: No fracture is identified. However additional imaging   including a PA view is recommended.            < from: Xray Pelvis 2 views (08.12.18 @ 21:10) >  EXAM:  XR PELVIS-1 OR 2 VIEWS                          PROCEDURE DATE:  08/12/2018          INTERPRETATION:  Indication: screening s/p fall    A single view the pelvis is submitted. There is multilevel degenerative   disease in the spine. Osteoarthritis of the hips is present. No fracture   is identified.      Impression: Osteoarthritis. No fracture is identified on this single   projection.        < from: CT Head No Cont (08.12.18 @ 19:56) >  EXAM:  CT BRAIN                          PROCEDURE DATE:  08/12/2018          INTERPRETATION:  PROCEDURE: CT head without intravenous contrast    CLINICAL INDICATION: Fall.    TECHNIQUE: Multiple axial images were obtained and viewed at 5 mm   intervals from the skull base to the vertex. The images were reviewed in   brain and bone windows. Imaging is performed using helical low-dose   technique, and sagittal and coronal reformations are provided.    COMPARISON: MRI brain 10/5/2007.    FINDINGS:     The ventricles are prominent and increased in size since the prior   examination. The cisternal spaces and cortical sulci are normal in size   and configuration for the patient's stated age.    There is no acute intracranial hemorrhage. There ayesha calcified 1.7 x 1.8   cm extra-axial mass along the right inferior frontal convexity which has   increased mildly in size and demonstrates increased surrounding vasogenic   edema/gliosis consistent with a meningioma. No midline shift.    The gray white differentiation appears preserved without evidence of an   acute transcortical infarction. There is mild periventricular white   matter attenuation, likely the sequela of small vessel ischemic disease.     The bony windows demonstrates no calvarial fracture. Chronic bilateral   nasal bone deformities are noted. The visualized paranasal sinuses and   mastoid air cells are predominantly clear. The lenses are absent   bilaterally suggesting prior cataract surgery.    IMPRESSION:   1.  No intracranial hemorrhage or calvarial fracture.  2.  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  3.  1.8 cm right frontal meningioma, as described above.          < from: CT Cervical Spine No Cont (08.12.18 @ 19:56) >  EXAM:  CT CERVICAL SPINE                          PROCEDURE DATE:  08/12/2018          INTERPRETATION:  PROCEDURE: CT Cervical spine without contrast    INDICATION: Fall.    TECHNIQUE: Multiple axial sections were obtained from the mid orbits to   the sternoclavicular joint. Sagittal and coronal reformats were obtained   from the axial data set. The images were reviewed in soft tissue and bone   windows.    COMPARISON: None.    FINDINGS: The CT exam demonstrates straightening of cervical spine which   may be secondary to positioning. The vertebral body heights are   maintained. The prevertebral soft tissues are within normal limits. The   osseous structures are intact without fracture. Multilevel intervertebral   disc space narrowing, most severe at C3-4, C4-5, and C5-6. There is   subchondral sclerosis and cystic change as well as multilevel productive   change. The patient is status post C5 and C6 laminectomies.    There is no large disc herniation or significant bony central spinal   canal stenosis. Multilevel moderate bilateral neural foramen narrowing is   noted.    A 2 cm right inferior thyroid lobe nodule is densely noted.    IMPRESSION:   Status post C5 and C6 laminectomies. No evidence of acute osseous injury   to the cervical spine.              Vital Signs Last 24 Hrs  T(C): 36.9 (13 Aug 2018 09:00), Max: 36.9 (13 Aug 2018 09:00)  T(F): 98.4 (13 Aug 2018 09:00), Max: 98.4 (13 Aug 2018 09:00)  HR: 59 (13 Aug 2018 09:00) (59 - 62)  BP: 135/72 (13 Aug 2018 09:00) (118/68 - 177/63)  BP(mean): --  RR: 18 (13 Aug 2018 09:00) (14 - 18)  SpO2: 94% (13 Aug 2018 09:00) (94% - 98%)    REVIEW OF SYSTEMS:    CONSTITUTIONAL: fatigue  EYES: No eye pain, visual disturbances, or discharge  ENMT:  No difficulty hearing, tinnitus, vertigo; No sinus or throat pain  NECK: No pain or stiffness  BREASTS: No pain, masses, or nipple discharge  RESPIRATORY: No cough, wheezing, chills or hemoptysis; No shortness of breath  CARDIOVASCULAR: No chest pain, palpitations, dizziness, or leg swelling  GASTROINTESTINAL: No abdominal or epigastric pain. No nausea, vomiting, or hematemesis; No diarrhea or constipation. No melena or hematochezia.  GENITOURINARY: No dysuria, frequency, hematuria, or incontinence  NEUROLOGICAL: No headaches, memory loss, loss of strength, numbness, or tremors  SKIN: No itching, burning, rashes, or lesions   LYMPH NODES: No enlarged glands  ENDOCRINE: No heat or cold intolerance; No hair loss  MUSCULOSKELETAL: left shoulder pain  PSYCHIATRIC: No depression, anxiety, mood swings, or difficulty sleeping  HEME/LYMPH: No easy bruising, or bleeding gums  ALLERGY AND IMMUNOLOGIC: No hives or eczema  VASCULAR: no swelling, erythema      Physical Exam: frail 89 yo  woman lying in semi Miles's position, with left shoulder sling, c/p left shoulder pain with movement    Head: normocephalic, left facial abrasion    Eyes: PERRLA, EOMI, no nystagmus, sclera anicteric    ENT: nasal discharge, uvula midline, no oropharyngeal erythema/exudate    Neck: supple, negative JVD, negative carotid bruits, no thyromegaly    Chest: CTA bilaterally, neg wheeze, rhonchi, rales, crackles, egophany    Cardiovascular: regular rate and rhythm, neg murmurs/rubs/gallops    Abdomen: soft, non distended, non tender, negative rebound/guarding, normal bowel sounds, neg hepatosplenomegaly    Extremities: WWP, neg cyanosis/clubbing/edema, negative calf tenderness to palpation, negative Nichole's sign, right hand kerlix secondary to skin tear, katherin LE leg cuts,     Left shoulder: neg hematoma, limited range in all planes secondary to pain, ttp overlying proximal humerus    :     Neurologic Exam:    Alert and oriented x 2 to person, place, 2011 date/year, speech fluent w/o dysarthria    Cranial Nerves:     II:                       pupils equal, round and reactive to light, visual fields intact   III/ IV/VI:            extraocular movements intact, neg nystagmus, ptosis  V:                       facial sensation intact, V1-3 normal  VII:                     face symmetric, no droop, normal eye closure and smile  VIII:                    hearing intact to finger rub bilaterally  IX/ X:                 soft palate rise symmetrical  XI:                      head turning, shoulder shrug normal  XII:                     tongue midline    Motor Exam:    Upper Extremities:              Right:    4/5 /intrinsics                                                          4/5 deltoid                                                          4+/5 biceps                                                          5/5 triceps                                                          5/5 wrist extensors-flexors                                                          negative pronator drift                                                Left:      4/5 /intrinsics                                                         deltoid not tested secondary to pain                                                          4/5 biceps                                                          4/5 triceps                                                          5/5 wrist extensors/flexors                                                          negative pronator drift      Lower Extremities:            Right:      4/5 hip flexors/adductors/abductors                                                           5/5 quadriceps/hamstrings                                                           5/5 dorsiflexors/plantar flexors/invertors-evertors                                               Left:       4/5 hip flexors/adductors/abductors                                                            5/5 quadriceps/hamstrings                                                            5/5 dorsiflexors/plantar flexors/invertors-evertors    Sensory:              intact to LT/PP in all UE/LE dermatomes    DTR:                   = biceps/     triceps/     brachioradialis                            = patella/   medial hamstring/    ankle                            neg clonus                            neg Babinski                            neg Hoffmans      Romberg:  not tested    Tandem Walking:  not tested    Gait:  not tested        PM&R Impression:    1) s/p fall with Acute minimally displaced and impacted left humeral fracture   2) non weight bearing LUE  3) deconditioned  4) gait dysfunction  5) dementia    Recommendations:    1) Physical therapy focusing on therapeutic exercises, bed mobility/transfer out of bed evaluation, progressive ambulation with assistive devices.    2) Anticipated Disposition Plan/Recommendations: subacute rehab placement

## 2018-08-13 NOTE — PROGRESS NOTE ADULT - ASSESSMENT
89 yo F w/PMH breast CA s/p left breast lumpectomy ~8 years ago, age-related macular degeneration, undiagnosed but likely dementia presented to ED BIBEMS s/p witnessed fall with L. humeral fracture impeding ability to ambulated with walker.     L humeral neck fracture minimally displaced and impacted.

## 2018-08-13 NOTE — H&P ADULT - PROBLEM SELECTOR PLAN 2
Sling placed, usually would be discharged from ED however in the setting of relying on walker to ambulate, considered an unsafe discharge because could not ambulate with walker.  - ortho consult in AM. Sling placed, usually would be discharged from ED however in the setting of relying on walker to ambulate, considered an unsafe discharge because could not ambulate with walker.  - ortho not officially consulted, not a surgical candidate given age.  - PT in AM Sling placed, usually would be discharged from ED however in the setting of relying on walker to ambulate, considered an unsafe discharge because could not ambulate with walker.  - ortho consult in AM   - PT in AM

## 2018-08-13 NOTE — PROGRESS NOTE ADULT - PROBLEM SELECTOR PLAN 8
1) PCP Contacted on Admission: N: Patient cannot remember name of PMD. Spoke with emergency contact Ms. Tawanna Houser, she called [facility]HA who will be coming in and we will ask for more information from her  2) Date of Contact with PCP:  3) PCP Contacted at Discharge: (Y/N, N/A)  4) Summary of Handoff Given to PCP:   5) Post-Discharge Appointment Date and Location: 1) PCP Contacted on Admission: Y: Dr. Juvenal Blackwell  2) Date of Contact with PCP: 8/13/18  3) PCP Contacted at Discharge: (Y/N, N/A)  4) Summary of Handoff Given to PCP:   5) Post-Discharge Appointment Date and Location:

## 2018-08-13 NOTE — H&P ADULT - PROBLEM SELECTOR PLAN 6
No signs of infection besides mild WBC 11.4. Possibly reactive in setting of fracture.  - repeat CBC in AM.    #incidental finding thyroid nodule- 2 cm inferior thyroid lobe  - tsh in AM

## 2018-08-13 NOTE — PHYSICAL THERAPY INITIAL EVALUATION ADULT - ADDITIONAL COMMENTS
Pt lives alone in an apartment with elevator. Pt has HHA x 12 hours x 7 days a week. Prior to admission, pt ambulated independently with rollator.

## 2018-08-13 NOTE — H&P ADULT - FAMILY HISTORY
Father  Still living? Unknown  No family history of cancer, Age at diagnosis: Age Unknown     Mother  Still living? Unknown  No family history of cancer, Age at diagnosis: Age Unknown

## 2018-08-13 NOTE — PROGRESS NOTE ADULT - PROBLEM SELECTOR PROBLEM 1
Fall (on) (from) other stairs and steps, initial encounter Closed fracture of shaft of left humerus, unspecified fracture morphology, initial encounter

## 2018-08-13 NOTE — H&P ADULT - PROBLEM SELECTOR PLAN 3
CT head read as prominent ventricles NPH vs volume loss.  - neuro consult in AM   - fall precautions as above    #meningioma- 1.8 cm, right frontal. No focal neuro deficits, but patient with ataxia and dementia.  - neuro consult in AM.

## 2018-08-14 ENCOUNTER — TRANSCRIPTION ENCOUNTER (OUTPATIENT)
Age: 83
End: 2018-08-14

## 2018-08-14 LAB
GLUCOSE BLDC GLUCOMTR-MCNC: 107 MG/DL — HIGH (ref 70–99)
GLUCOSE BLDC GLUCOMTR-MCNC: 150 MG/DL — HIGH (ref 70–99)

## 2018-08-14 PROCEDURE — 99233 SBSQ HOSP IP/OBS HIGH 50: CPT | Mod: GC

## 2018-08-14 PROCEDURE — 71045 X-RAY EXAM CHEST 1 VIEW: CPT | Mod: 26

## 2018-08-14 RX ORDER — BACITRACIN ZINC 500 UNIT/G
1 OINTMENT IN PACKET (EA) TOPICAL ONCE
Qty: 0 | Refills: 0 | Status: COMPLETED | OUTPATIENT
Start: 2018-08-14 | End: 2018-08-15

## 2018-08-14 RX ADMIN — HEPARIN SODIUM 5000 UNIT(S): 5000 INJECTION INTRAVENOUS; SUBCUTANEOUS at 18:26

## 2018-08-14 RX ADMIN — HEPARIN SODIUM 5000 UNIT(S): 5000 INJECTION INTRAVENOUS; SUBCUTANEOUS at 06:22

## 2018-08-14 RX ADMIN — Medication 1 DROP(S): at 12:07

## 2018-08-14 RX ADMIN — Medication 650 MILLIGRAM(S): at 22:41

## 2018-08-14 RX ADMIN — Medication 650 MILLIGRAM(S): at 21:41

## 2018-08-14 NOTE — PROGRESS NOTE ADULT - PROBLEM SELECTOR PLAN 8
1) PCP Contacted on Admission: Y: Dr. Juvenal Blackwell  2) Date of Contact with PCP: 8/13/18  3) PCP Contacted at Discharge: (Y/N, N/A)  4) Summary of Handoff Given to PCP:   5) Post-Discharge Appointment Date and Location:

## 2018-08-14 NOTE — DISCHARGE NOTE ADULT - HOSPITAL COURSE
Patient is a pleasant 89yo F w/ PMH of breast cancer s/p L breast lumpectomy 8 years ago, age-related macular degeneration presented to [facility] ED [facility] s/p witnessed mechanical fall when exiting an escalator at [facility]. Per patient, this was her first fall. She lives at home alone with HHA 7d/wk, 12hr/d. In the ED she was hemodynamically stable with elevated -177/63/77. 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 CT c-spine no acute pathology. Xray pelvis showed no fracture, osteoarthritic changes. Xray L shoulder and upper extremity showed acute minimally displaced and impacted L humoral neck fracture. Normally would have been treated in ED and discharged, but patient is dependent on a walker and so was deemed unsafe for discharge due to her inability to use her L arm. Upon admission patient was seen by orthopedics who recommended no surgical intervention at the time and conservative, non-weight bearing treatment. Eval by PT recommended placement in [facility] due to limited mobility in the setting of her fracture. On admission patient had a mildly elevated WBC of 11.4 with no signs or symptoms of infection. She was afebrile throughout her hospital stay and repeat CBC the next AM showed normal WBC at 8.5.    At this time patient is hemodynamically stable and ready for discharge to [facility] placement. Patient is a pleasant 89yo F w/ PMH of breast cancer s/p L breast lumpectomy 8 years ago, age-related macular degeneration presented to [facility] ED [facility] s/p witnessed mechanical fall when exiting an escalator at [facility]. Per patient, this was her first fall. She lives at home alone with HHA 7d/wk, 12hr/d. In the ED she was hemodynamically stable with elevated -177/63/77. 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 CT c-spine no acute pathology. Xray pelvis showed no fracture, osteoarthritic changes. Xray L shoulder and upper extremity showed acute minimally displaced and impacted L humoral neck fracture. Normally would have been treated in ED and discharged, but patient is dependent on a walker and so was deemed unsafe for discharge due to her inability to use her L arm. Upon admission patient was seen by orthopedics who recommended no surgical intervention at the time and conservative, non-weight bearing treatment. Eval by PT recommended placement in [facility] due to limited mobility in the setting of her fracture. On admission patient had a mildly elevated WBC of 11.4 with no signs or symptoms of infection. She was afebrile throughout her hospital stay and repeat CBC the next AM showed normal WBC at 8.5.    On the day of discharge, the patient was seen and examined. Symptoms improved. Vital signs are stable. Labs and imaging reviewed. Patient is medically optimized and hemodynamically stable. Return precautions discussed, medication teach back done, and importance of physician followup emphasized. The patient and family verbalized understanding. Patient is a pleasant 89yo F w/ PMH of breast cancer s/p L breast lumpectomy 8 years ago, age-related macular degeneration presented to [facility] ED [facility] s/p witnessed mechanical fall when exiting an escalator at [facility]. Per patient, this was her first fall. She lives at home alone with HHA 7d/wk, 12hr/d. In the ED she was hemodynamically stable with elevated -177/63/77. 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 CT c-spine no acute pathology. Xray pelvis showed no fracture, osteoarthritic changes. Xray L shoulder and upper extremity showed acute minimally displaced and impacted L humoral neck fracture. Normally would have been treated in ED and discharged, but patient is dependent on a walker and so was deemed unsafe for discharge due to her inability to use her L arm. Upon admission patient was seen by orthopedics who recommended no surgical intervention at the time and conservative, non-weight bearing treatment. Eval by PT recommended placement in [facility] due to limited mobility in the setting of her fracture. On admission patient had a mildly elevated WBC of 11.4 with no signs or symptoms of infection. She was afebrile throughout her hospital stay and repeat CBC the next AM showed normal WBC at 8.5. Pt going to [facility].    On the day of discharge, the patient was seen and examined. Symptoms improved. Vital signs are stable. Labs and imaging reviewed. Patient is medically optimized and hemodynamically stable. Return precautions discussed, medication teach back done, and importance of physician followup emphasized. The patient and family verbalized understanding.

## 2018-08-14 NOTE — PROGRESS NOTE ADULT - PROBLEM SELECTOR PLAN 6
- normal Outpatient follow up with ophthalmologist
- normal Outpatient follow up with ophthalmologist

## 2018-08-14 NOTE — PROGRESS NOTE ADULT - PROBLEM SELECTOR PLAN 4
No signs of infection besides mild WBC 11.4. Possibly reactive in setting of fracture.  - repeat CBC in AM on 8/13/18 showed WBC 8.5    #incidental finding thyroid nodule- 2 cm inferior thyroid lobe  - tsh in AM No signs of infection besides mild WBC 11.4. Possibly reactive in setting of fracture.  - repeat CBC in AM on 8/13/18 showed WBC 8.5    #incidental finding thyroid nodule- 2 cm inferior thyroid lobe  - tsh in AM --> 0.4

## 2018-08-14 NOTE — PROGRESS NOTE ADULT - ASSESSMENT
89 yo F w/PMH breast CA s/p left breast lumpectomy ~8 years ago, age-related macular degeneration, undiagnosed but likely dementia presented to ED BIBEMS s/p witnessed fall with L. humeral fracture impeding ability to ambulated with walker.     L humeral neck fracture minimally displaced and impacted. Recommended for Subacute Rehab.    Problem/Plan - 1:  ·  Problem: Closed fracture of shaft of left humerus, unspecified fracture morphology, initial encounter.  Plan: Sling placed, usually would be discharged from ED however in the setting of relying on walker to ambulate, considered an unsafe discharge because could not ambulate with walker.  - ortho consult -> no surgical intervention at this time, conservative treatment, non-weight bearing  - PT/OT recs: D/C to [facility], therapy 2-3x/wk  - Tylenol PRN.     Problem/Plan - 2:  ·  Problem: Fall (on) (from) other stairs and steps, initial encounter.  Plan: Mechanical fall from escalator stairs with no prodromal symptoms, EKG wnl. 1st fall, no LOC however did hit head. 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

## 2018-08-14 NOTE — DISCHARGE NOTE ADULT - MEDICATION SUMMARY - MEDICATIONS TO TAKE
I will START or STAY ON the medications listed below when I get home from the hospital:    Dry Eye Relief preserved ophthalmic solution  -- 1 drop(s) to each affected eye 2 times a day  -- Indication: For Macular degeneration of both eyes, unspecified type

## 2018-08-14 NOTE — PROGRESS NOTE ADULT - SUBJECTIVE AND OBJECTIVE BOX
Physical Medicine and Rehabilitation Progress Note:    Patient is a 89y old  Female who presents with a chief complaint of s/p mechanical fall with L. humerus fracture impairing ability to use walker (14 Aug 2018 08:43)      HPI:  Patient is a pleasant 89 yo F w/PMH breast CA s/p left breast lumpectomy ~8 years ago, age-related macular degeneration, undiagnosed but likely dementia presented to ED [facility] s/p witnessed fall when exiting escalator at [facility] earlier in day. Patient states she was exiting the escalator with her walker when she tripped and fell flat on her face, hitting her nose, bilateral knees, causing bruising to her hands. She denies preceding CP, SOB, light headedness, palpitations, n/v, weakness, LOC. After the fall patient states she experienced pain in her left knee, hip, and arm and had ~10 consecutive episodes of small volume vomiting, no preceding nausea, this has since resolved. She states this is the first time she ever fell. She does not take any medications at home. She lives at home alone with A 5/7 days of the week, 7 hours/day but A normally leaves early before her shift ends.  Patient is a poor historian, has preserved short term recall (today's events are clear) but cannot recall names of close family members, PMD, etc.  ROS + for urinary incontinence, worsening over past few weeks, also with abnormal gait for which patient uses walker.     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  CT C-spine w/out acute pathology. XRAY pelvis wet read: no fracture, pending official read.   XRAY shoulder and L humerus with acute minimally displaced and impacted left humeral fracture.    Patient given left shoulder sling, tylenol for pain, Tdap vaccine, wound dressed and patient admitted to medicine for PT eval since cannot ambulate independent of walker. (13 Aug 2018 00:36)                            11.6   8.5   )-----------( 252      ( 13 Aug 2018 07:31 )             36.6       08-13    141  |  102  |  18  ----------------------------<  118<H>  4.0   |  27  |  0.65    Ca    8.6      13 Aug 2018 07:31  Mg     2.2     08-13    TPro  6.6  /  Alb  3.9  /  TBili  0.4  /  DBili  x   /  AST  20  /  ALT  14  /  AlkPhos  64  08-12    Vital Signs Last 24 Hrs  T(C): 37.3 (14 Aug 2018 08:30), Max: 37.3 (14 Aug 2018 08:30)  T(F): 99.1 (14 Aug 2018 08:30), Max: 99.1 (14 Aug 2018 08:30)  HR: 60 (14 Aug 2018 08:30) (60 - 74)  BP: 158/70 (14 Aug 2018 08:30) (127/79 - 160/83)  BP(mean): --  RR: 16 (14 Aug 2018 08:30) (16 - 18)  SpO2: 92% (14 Aug 2018 08:30) (92% - 94%)    MEDICATIONS  (STANDING):  artificial  tears Solution 1 Drop(s) Both EYES daily  BACItracin   Ointment 1 Application(s) Topical once  dextrose 5%. 1000 milliLiter(s) (50 mL/Hr) IV Continuous <Continuous>  dextrose 50% Injectable 12.5 Gram(s) IV Push once  dextrose 50% Injectable 25 Gram(s) IV Push once  dextrose 50% Injectable 25 Gram(s) IV Push once  heparin  Injectable 5000 Unit(s) SubCutaneous every 12 hours    MEDICATIONS  (PRN):  acetaminophen   Tablet. 650 milliGRAM(s) Oral every 6 hours PRN Moderate Pain (4 - 6)  dextrose 40% Gel 15 Gram(s) Oral once PRN Blood Glucose LESS THAN 70 milliGRAM(s)/deciliter  glucagon  Injectable 1 milliGRAM(s) IntraMuscular once PRN Glucose LESS THAN 70 milligrams/deciliter    Currently Undergoing Physical Therapy at bedside.    Functional Status Assessment:    Previous Level of Function:     · Ambulation Skills	independent; needs device	  · Transfer Skills	needs device	  · ADL Skills	needed assist; needs device	  · Work/Leisure Activity	needed assist; needs device	  · Additional Comments	Pt lives alone in an apartment with elevator. Pt has HHA x 12 hours x 7 days a week. Prior to admission, pt ambulated independently with rollator.	    Cognitive Status Examination:   · Orientation	oriented to person, place, time and situation	  · Level of Consciousness	alert	  · Follows Commands and Answers Questions	100% of the time	  · Personal Safety and Judgment	intact	    Peripheral Neurovascular:     · Edema 2+ (Mild)	left shoulder.	    Skin:   Skin:  · Skin Color/Characteristics	dressing on left shin C/D/I	    Range of Motion Exam:   · Active Range of Motion Examination	AROM WFL through out except left shoulder NT.	    Manual Muscle Testing:   · Manual Muscle Testing Results	RUE~3+/5 grossly, Left UE NT, b/l LEs~3+/5 grossly.	    Bed Mobility: Scooting/Bridging:     · Level of [place]	minimum assist (75% patients effort); moderate assist (50% patients effort)	  · Physical Assist/Nonphysical Assist	1 person assist; verbal cues	    Bed Mobility: Sit to Supine:     · Level of [place]	not performed	    Bed Mobility: Supine to Sit:     · Level of [place]	moderate assist (50% patients effort)	  · Physical Assist/Nonphysical Assist	1 person assist; verbal cues	    Bed Mobility Analysis:     · Bed Mobility Limitations	decreased ability to use arms for pushing/pulling; decreased ability to use legs for bridging/pushing; impaired ability to control trunk for mobility	  · Impairments Contributing to Impaired Bed Mobility	impaired balance; decreased strength; pain	    Transfer: Sit to Stand:     · Level of [place]	moderate assist (50% patients effort)	  · Physical Assist/Nonphysical Assist	1 person assist	  · Weight-Bearing Restrictions	nonweight-bearing; LUE	  · Assistive Device	1 HHA and sling on LUE	    Transfer: Stand to Sit:     · Level of [place]	moderate assist (50% patients effort)	  · Physical Assist/Nonphysical Assist	1 person assist; verbal cues	  · Weight-Bearing Restrictions	LUE in sling	  · Assistive Device	1 HHA	    Sit/Stand Transfer Safety Analysis:     · Impairments Contributing to Impaired Transfers	impaired balance; pain; decreased strength	    Gait Skills:     · Level of [place]	moderate assist (50% patients effort)	  · Physical Assist/Nonphysical Assist	1 person assist; verbal cues	  · Weight-Bearing Restrictions	nonweight-bearing; LUE in sling	  · Assistive Device	1 HHA	  · Gait Distance	18 feet x 2	    Gait Analysis:     · Gait Pattern Used	swing-to gait	  · Gait Deviations Noted	decreased [place]; decreased step length; decreased weight-shifting ability; unsteady gait, no lose of balance, decreased heel strike and hip flexion, decreased foot clearance from the floor during ambulation	  · Impairments Contributing to Gait Deviations	impaired balance; pain; decreased strength	    Stair Negotiation:     · Level of [place]	unable to perform	    Balance Skills Assessment:     · Sitting Balance: Static	fair balance	  · Sitting Balance: Dynamic	fair minus	  · Sit-to-Stand Balance	poor balance	  · Standing Balance: Static	poor balance	  · Standing Balance: Dynamic	poor balance	    Sensory Examination:   Sensory Examination:    Light Touch Sensation:   · Left UE	within normal limits	  · Right UE	within normal limits	  · Left LE	within normal limits	  · Right LE	within normal limits	      Clinical Impressions:   · Criteria for Skilled Therapeutic Interventions	impairments found; functional limitations in following categories; rehab potential; therapy frequency; anticipated discharge recommendation	  · Impairments Found (describe specific impairments)	aerobic capacity/endurance; gross motor; gait, locomotion, and balance; poor safety awareness; muscle strength	  · Functional Limitations in Following Categories (describe specific limitations)	self-care; home management; community/leisure	  · Rehab Potential	good, to achieve stated therapy goals	  · Therapy Frequency	2-3x/week	        PM&R Impression: as above    Disposition Plan Recommendations: subacute rehab placement

## 2018-08-14 NOTE — PROGRESS NOTE ADULT - PROBLEM SELECTOR PLAN 2
Mechanical fall from escalator stairs with no prodromal symptoms, EKG wnl. 1st fall, no LOC however did hit head. 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   - PT/OT recs as above  - Fall precautions

## 2018-08-14 NOTE — PROGRESS NOTE ADULT - PROBLEM SELECTOR PLAN 7
VTE PPX: Hep sub q  F: None   E: Replete PRN   N: DASH/TLC    DISPO: RMF  CODE STATUS: DNR/DNI
VTE PPX: Hep sub q  F: None   E: Replete PRN   N: DASH/TLC    DISPO: RMF  CODE STATUS: DNR/DNI

## 2018-08-14 NOTE — PROGRESS NOTE ADULT - ASSESSMENT
89 yo F w/PMH breast CA s/p left breast lumpectomy ~8 years ago, age-related macular degeneration, undiagnosed but likely dementia presented to ED BIBEMS s/p witnessed fall with L. humeral fracture impeding ability to ambulated with walker.     L humeral neck fracture minimally displaced and impacted. 89 yo F w/PMH breast CA s/p left breast lumpectomy ~8 years ago, age-related macular degeneration, undiagnosed but likely dementia presented to ED BIBEMS s/p witnessed fall with L. humeral fracture impeding ability to ambulated with walker.     L humeral neck fracture minimally displaced and impacted. Recommended for Subacute Rehab.

## 2018-08-14 NOTE — PROGRESS NOTE ADULT - PROBLEM SELECTOR PLAN 3
Patient A&OX1, cannot recall long term details. Patient A&OX1, cannot recall long term details, at her baseline

## 2018-08-14 NOTE — PROGRESS NOTE ADULT - PROBLEM SELECTOR PROBLEM 6
Macular degeneration of both eyes, unspecified type
Macular degeneration of both eyes, unspecified type

## 2018-08-14 NOTE — DISCHARGE NOTE ADULT - NS AS ACTIVITY OBS
No weight bearing L shoulder, cannot use walker, increase activity as per PT/No Heavy lifting/straining

## 2018-08-14 NOTE — PROGRESS NOTE ADULT - SUBJECTIVE AND OBJECTIVE BOX
INTERVAL HPI/OVERNIGHT EVENTS:  Patient was seen and examined at bedside. As per nurse and patient, no o/n events, patient resting comfortably. Pt endorses continued shoulder/upper arm pain consistent with her humoral neck fracture, improved since yesterday. Commented on some pain in her LLE and R hand related to abrasions on each. Patient denies: fever, chills, dizziness, HA, changes in vision, CP, palpitations, SOB, cough, N/V/D/C, dysuria, changes in bowel movements.    ICU Vital Signs Last 24 Hrs  T(C): 37.3 (14 Aug 2018 08:30), Max: 37.3 (14 Aug 2018 08:30)  T(F): 99.1 (14 Aug 2018 08:30), Max: 99.1 (14 Aug 2018 08:30)  HR: 60 (14 Aug 2018 08:30) (60 - 74)  BP: 158/70 (14 Aug 2018 08:30) (127/79 - 160/83)  RR: 16 (14 Aug 2018 08:30) (16 - 18)  SpO2: 92% (14 Aug 2018 08:30) (92% - 94%)    PHYSICAL EXAM:    Constitutional: patient seen in bed resting comfortably, in NAD  HEENT: PERRL, EOMI, sclera non-icteric, b/l mild clouding of cornea, abrasion L side of face, MMM  Respiratory: CTA b/l, good air entry b/l, no wheezing, no rhonchi, no rales.  Cardiovascular: RRR, normal S1S2, no M/R/G  Gastrointestinal: soft, nondistended, mildly tender in the lower quadrants with some fullness noted, no masses palpable, BS normal  Extremities: LUE in a sling, L shoulder and upper arm edematous and tender. RUE: abrasion to dorsum of hand. LLE: abrasion to anterior shin. No erythema, warmth, drainage from abrasions.  Neurological: AAOx1, (oriented to person, not to place (knows she's in [place], not the hospital) or date) CN Grossly intact  Skin: Normal temperature, warm, dry, abrasions as noted above    MEDICATIONS  (STANDING):  artificial  tears Solution 1 Drop(s) Both EYES daily  BACItracin   Ointment 1 Application(s) Topical once  dextrose 5%. 1000 milliLiter(s) (50 mL/Hr) IV Continuous <Continuous>  dextrose 50% Injectable 12.5 Gram(s) IV Push once  dextrose 50% Injectable 25 Gram(s) IV Push once  dextrose 50% Injectable 25 Gram(s) IV Push once  heparin  Injectable 5000 Unit(s) SubCutaneous every 12 hours    MEDICATIONS  (PRN):  acetaminophen   Tablet. 650 milliGRAM(s) Oral every 6 hours PRN Moderate Pain (4 - 6)  dextrose 40% Gel 15 Gram(s) Oral once PRN Blood Glucose LESS THAN 70 milliGRAM(s)/deciliter  glucagon  Injectable 1 milliGRAM(s) IntraMuscular once PRN Glucose LESS THAN 70 milligrams/deciliter      Allergies    No Known Allergies    Intolerances        LABS:                        11.6   8.5   )-----------( 252      ( 13 Aug 2018 07:31 )             36.6     08-13    141  |  102  |  18  ----------------------------<  118<H>  4.0   |  27  |  0.65    Ca    8.6      13 Aug 2018 07:31  Mg     2.2     08-13    TPro  6.6  /  Alb  3.9  /  TBili  0.4  /  DBili  x   /  AST  20  /  ALT  14  /  AlkPhos  64  08-12          RADIOLOGY & ADDITIONAL TESTS:  Xray, LUE  L humeral neck fracture minimally displaced and impacted. INTERVAL HPI/OVERNIGHT EVENTS:  Patient was seen and examined at bedside. As per nurse and patient, no o/n events, patient resting comfortably. Pt endorses continued shoulder/upper arm pain consistent with her humoral neck fracture, improved since yesterday. Commented on some pain in her LLE and R hand related to abrasions on each. Patient denies: fever, chills, dizziness, HA, changes in vision, CP, palpitations, SOB, cough, N/V/D/C, dysuria, changes in bowel movements.    Vital Signs Last 24 Hrs  T(C): 37.3 (14 Aug 2018 08:30), Max: 37.3 (14 Aug 2018 08:30)  T(F): 99.1 (14 Aug 2018 08:30), Max: 99.1 (14 Aug 2018 08:30)  HR: 60 (14 Aug 2018 08:30) (60 - 74)  BP: 158/70 (14 Aug 2018 08:30) (127/79 - 160/83)  RR: 16 (14 Aug 2018 08:30) (16 - 18)  SpO2: 92% (14 Aug 2018 08:30) (92% - 94%)    PHYSICAL EXAM:  Constitutional: patient seen in bed resting comfortably, in NAD  HEENT: PERRL, EOMI, sclera non-icteric, b/l mild clouding of cornea, abrasion L side of face, MMM  Respiratory: CTA b/l, good air entry b/l, no wheezing, no rhonchi, no rales.  Cardiovascular: RRR, normal S1S2, no M/R/G  Gastrointestinal: soft, nondistended, mildly tender in the lower quadrants with some fullness noted, no masses palpable, BS normal  Extremities: LUE in a sling, L shoulder and upper arm edematous and tender. RUE: abrasion to dorsum of hand. LLE: abrasion to anterior shin. No erythema, warmth, drainage from abrasions.  Neurological: AAOx1, (oriented to person, not to place (knows she's in [place], not the hospital) or date) CN Grossly intact  Skin: Normal temperature, warm, dry, abrasions as noted above    MEDICATIONS  (STANDING):  artificial  tears Solution 1 Drop(s) Both EYES daily  BACItracin   Ointment 1 Application(s) Topical once  dextrose 5%. 1000 milliLiter(s) (50 mL/Hr) IV Continuous <Continuous>  dextrose 50% Injectable 12.5 Gram(s) IV Push once  dextrose 50% Injectable 25 Gram(s) IV Push once  dextrose 50% Injectable 25 Gram(s) IV Push once  heparin  Injectable 5000 Unit(s) SubCutaneous every 12 hours    MEDICATIONS  (PRN):  acetaminophen   Tablet. 650 milliGRAM(s) Oral every 6 hours PRN Moderate Pain (4 - 6)  dextrose 40% Gel 15 Gram(s) Oral once PRN Blood Glucose LESS THAN 70 milliGRAM(s)/deciliter  glucagon  Injectable 1 milliGRAM(s) IntraMuscular once PRN Glucose LESS THAN 70 milligrams/deciliter      Allergies    No Known Allergies    Intolerances        LABS:                        11.6   8.5   )-----------( 252      ( 13 Aug 2018 07:31 )             36.6     08-13    141  |  102  |  18  ----------------------------<  118<H>  4.0   |  27  |  0.65    Ca    8.6      13 Aug 2018 07:31  Mg     2.2     08-13    TPro  6.6  /  Alb  3.9  /  TBili  0.4  /  DBili  x   /  AST  20  /  ALT  14  /  AlkPhos  64  08-12          RADIOLOGY & ADDITIONAL TESTS:  Xray, LUE  L humeral neck fracture minimally displaced and impacted.

## 2018-08-14 NOTE — DISCHARGE NOTE ADULT - PLAN OF CARE
Return to normal level of activity and use of arm When you came to the hospital you had suffered a fall and in the Emergency room an X-ray showed that you had broken the upper portion of your humerus, which is the long bone of your upper arm. We consulted orthopedics who said you did not need any surgery, there was also no need for a cast or splint given the location of the break. You were given a sling to immobilize your arm while it heals. When you leave the hospital you will be placed in a subacute rehabilitation (ARTIE) center to help you build your strength while you heal. The reason we are recommending ARTIE is because without the use of your arm you cannot use your walker at home and we feel it would be unsafe. Please follow up with your PCP. You came to the ER due to the fall that you had on an escalator at [facility].  Please follow up with your PCP During your time in the hospital you had multiple instances of memory lapses that can be consistent with dementia. We recommend following up with your PCP to discuss this diagnosis and whether any changes need to be made to your normal daily routine. Please continue your regular outpatient management of your macular degeneration and follow up with your PCP. You came to the ER due to the fall that you had on an escalator at [facility]. See above  Please follow up with your PCP When you came to the hospital you had suffered a fall and in the Emergency room an X-ray showed that you had broken the upper portion of your humerus, which is the long bone of your upper arm. We consulted orthopedics who said you did not need any surgery, there was also no need for a cast or splint given the location of the break. You were given a sling to immobilize your arm while it heals. When you leave the hospital you will be placed in a subacute rehabilitation (ARTIE) center to help you build your strength while you heal. The reason we are recommending ARTIE is because without the use of your arm you cannot use your walker at home and we feel it would be unsafe. Please follow up with your PCP. Please take tylenol as needed for L arm/shoulder pain

## 2018-08-14 NOTE — DISCHARGE NOTE ADULT - COMMUNITY RESOURCES
[facility] ambulance scheduled for 8/15 at 11 am, trip # 296-A to [facility]

## 2018-08-14 NOTE — DISCHARGE NOTE ADULT - SECONDARY DIAGNOSIS.
Fall (on) (from) other stairs and steps, initial encounter Dementia without behavioral disturbance, unspecified dementia type Macular degeneration of both eyes, unspecified type

## 2018-08-14 NOTE — DISCHARGE NOTE ADULT - CARE PLAN
Principal Discharge DX:	Other closed nondisplaced fracture of proximal end of left humerus, initial encounter  Goal:	Return to normal level of activity and use of arm  Assessment and plan of treatment:	When you came to the hospital you had suffered a fall and in the Emergency room an X-ray showed that you had broken the upper portion of your humerus, which is the long bone of your upper arm. We consulted orthopedics who said you did not need any surgery, there was also no need for a cast or splint given the location of the break. You were given a sling to immobilize your arm while it heals. When you leave the hospital you will be placed in a subacute rehabilitation (ARTIE) center to help you build your strength while you heal. The reason we are recommending ARTIE is because without the use of your arm you cannot use your walker at home and we feel it would be unsafe. Please follow up with your PCP.  Secondary Diagnosis:	Fall (on) (from) other stairs and steps, initial encounter  Assessment and plan of treatment:	You came to the ER due to the fall that you had on an escalator at [facility].  Please follow up with your PCP  Secondary Diagnosis:	Dementia without behavioral disturbance, unspecified dementia type  Assessment and plan of treatment:	During your time in the hospital you had multiple instances of memory lapses that can be consistent with dementia. We recommend following up with your PCP to discuss this diagnosis and whether any changes need to be made to your normal daily routine.  Secondary Diagnosis:	Macular degeneration of both eyes, unspecified type  Assessment and plan of treatment:	Please continue your regular outpatient management of your macular degeneration and follow up with your PCP. Principal Discharge DX:	Other closed nondisplaced fracture of proximal end of left humerus, initial encounter  Goal:	Return to normal level of activity and use of arm  Assessment and plan of treatment:	When you came to the hospital you had suffered a fall and in the Emergency room an X-ray showed that you had broken the upper portion of your humerus, which is the long bone of your upper arm. We consulted orthopedics who said you did not need any surgery, there was also no need for a cast or splint given the location of the break. You were given a sling to immobilize your arm while it heals. When you leave the hospital you will be placed in a subacute rehabilitation (ARTIE) center to help you build your strength while you heal. The reason we are recommending ARTIE is because without the use of your arm you cannot use your walker at home and we feel it would be unsafe. Please follow up with your PCP.  Secondary Diagnosis:	Fall (on) (from) other stairs and steps, initial encounter  Assessment and plan of treatment:	You came to the ER due to the fall that you had on an escalator at [facility]. See above  Please follow up with your PCP  Secondary Diagnosis:	Dementia without behavioral disturbance, unspecified dementia type  Assessment and plan of treatment:	During your time in the hospital you had multiple instances of memory lapses that can be consistent with dementia. We recommend following up with your PCP to discuss this diagnosis and whether any changes need to be made to your normal daily routine.  Secondary Diagnosis:	Macular degeneration of both eyes, unspecified type  Assessment and plan of treatment:	Please continue your regular outpatient management of your macular degeneration and follow up with your PCP. Principal Discharge DX:	Other closed nondisplaced fracture of proximal end of left humerus, initial encounter  Goal:	Return to normal level of activity and use of arm  Assessment and plan of treatment:	When you came to the hospital you had suffered a fall and in the Emergency room an X-ray showed that you had broken the upper portion of your humerus, which is the long bone of your upper arm. We consulted orthopedics who said you did not need any surgery, there was also no need for a cast or splint given the location of the break. You were given a sling to immobilize your arm while it heals. When you leave the hospital you will be placed in a subacute rehabilitation (ARTIE) center to help you build your strength while you heal. The reason we are recommending ARTIE is because without the use of your arm you cannot use your walker at home and we feel it would be unsafe. Please follow up with your PCP. Please take tylenol as needed for L arm/shoulder pain  Secondary Diagnosis:	Fall (on) (from) other stairs and steps, initial encounter  Assessment and plan of treatment:	You came to the ER due to the fall that you had on an escalator at [facility]. See above  Please follow up with your PCP  Secondary Diagnosis:	Dementia without behavioral disturbance, unspecified dementia type  Assessment and plan of treatment:	During your time in the hospital you had multiple instances of memory lapses that can be consistent with dementia. We recommend following up with your PCP to discuss this diagnosis and whether any changes need to be made to your normal daily routine.  Secondary Diagnosis:	Macular degeneration of both eyes, unspecified type  Assessment and plan of treatment:	Please continue your regular outpatient management of your macular degeneration and follow up with your PCP.

## 2018-08-14 NOTE — DISCHARGE NOTE ADULT - PATIENT PORTAL LINK FT
You can access the [employer] Patient Portal, offered by [facility], by registering with the following website: [email or web address]

## 2018-08-14 NOTE — DISCHARGE NOTE ADULT - CARE PROVIDER_API CALL
Juvenal Blackwell), Internal Medicine  [address] [id number]  Phone: [phone]  Fax: [phone]

## 2018-08-14 NOTE — PROGRESS NOTE ADULT - PROBLEM SELECTOR PLAN 5
No reported history of DM II.  - f/u HbA1c in AM  - ISS for now, consider discontinuing if normal FS. No reported history of DM II.  - f/u HbA1c in AM --> 6.1  - ISS for now, consider discontinuing if normal FS, stopped checking FS

## 2018-08-15 VITALS
OXYGEN SATURATION: 91 % | HEART RATE: 68 BPM | TEMPERATURE: 97 F | DIASTOLIC BLOOD PRESSURE: 72 MMHG | SYSTOLIC BLOOD PRESSURE: 119 MMHG | RESPIRATION RATE: 16 BRPM

## 2018-08-15 PROCEDURE — 71045 X-RAY EXAM CHEST 1 VIEW: CPT

## 2018-08-15 PROCEDURE — 90715 TDAP VACCINE 7 YRS/> IM: CPT

## 2018-08-15 PROCEDURE — 99239 HOSP IP/OBS DSCHRG MGMT >30: CPT

## 2018-08-15 PROCEDURE — 85027 COMPLETE CBC AUTOMATED: CPT

## 2018-08-15 PROCEDURE — 82962 GLUCOSE BLOOD TEST: CPT

## 2018-08-15 PROCEDURE — 83036 HEMOGLOBIN GLYCOSYLATED A1C: CPT

## 2018-08-15 PROCEDURE — 36415 COLL VENOUS BLD VENIPUNCTURE: CPT

## 2018-08-15 PROCEDURE — 86850 RBC ANTIBODY SCREEN: CPT

## 2018-08-15 PROCEDURE — 73100 X-RAY EXAM OF WRIST: CPT

## 2018-08-15 PROCEDURE — 84443 ASSAY THYROID STIM HORMONE: CPT

## 2018-08-15 PROCEDURE — 90471 IMMUNIZATION ADMIN: CPT

## 2018-08-15 PROCEDURE — 99285 EMERGENCY DEPT VISIT HI MDM: CPT | Mod: 25

## 2018-08-15 PROCEDURE — 72170 X-RAY EXAM OF PELVIS: CPT

## 2018-08-15 PROCEDURE — 86900 BLOOD TYPING SEROLOGIC ABO: CPT

## 2018-08-15 PROCEDURE — 80048 BASIC METABOLIC PNL TOTAL CA: CPT

## 2018-08-15 PROCEDURE — 86901 BLOOD TYPING SEROLOGIC RH(D): CPT

## 2018-08-15 PROCEDURE — 93005 ELECTROCARDIOGRAM TRACING: CPT

## 2018-08-15 PROCEDURE — 82306 VITAMIN D 25 HYDROXY: CPT

## 2018-08-15 PROCEDURE — 97162 PT EVAL MOD COMPLEX 30 MIN: CPT

## 2018-08-15 PROCEDURE — 80053 COMPREHEN METABOLIC PANEL: CPT

## 2018-08-15 PROCEDURE — 83735 ASSAY OF MAGNESIUM: CPT

## 2018-08-15 PROCEDURE — 73030 X-RAY EXAM OF SHOULDER: CPT

## 2018-08-15 PROCEDURE — 70450 CT HEAD/BRAIN W/O DYE: CPT

## 2018-08-15 PROCEDURE — 73060 X-RAY EXAM OF HUMERUS: CPT

## 2018-08-15 PROCEDURE — 97116 GAIT TRAINING THERAPY: CPT

## 2018-08-15 PROCEDURE — 72125 CT NECK SPINE W/O DYE: CPT

## 2018-08-15 PROCEDURE — 82607 VITAMIN B-12: CPT

## 2018-08-15 RX ADMIN — Medication 1 APPLICATION(S): at 11:05

## 2018-08-15 RX ADMIN — HEPARIN SODIUM 5000 UNIT(S): 5000 INJECTION INTRAVENOUS; SUBCUTANEOUS at 06:53

## 2018-08-15 RX ADMIN — Medication 1 DROP(S): at 11:05
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
## 2018-08-20 DIAGNOSIS — R32 UNSPECIFIED URINARY INCONTINENCE: ICD-10-CM

## 2018-08-20 DIAGNOSIS — Z66 DO NOT RESUSCITATE: ICD-10-CM

## 2018-08-20 DIAGNOSIS — R26.0 ATAXIC GAIT: ICD-10-CM

## 2018-08-20 DIAGNOSIS — S42.292A OTHER DISPLACED FRACTURE OF UPPER END OF LEFT HUMERUS, INITIAL ENCOUNTER FOR CLOSED FRACTURE: ICD-10-CM

## 2018-08-20 DIAGNOSIS — G91.2 (IDIOPATHIC) NORMAL PRESSURE HYDROCEPHALUS: ICD-10-CM

## 2018-08-20 DIAGNOSIS — R73.9 HYPERGLYCEMIA, UNSPECIFIED: ICD-10-CM
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
## 2018-08-20 DIAGNOSIS — Z85.3 PERSONAL HISTORY OF MALIGNANT NEOPLASM OF BREAST: ICD-10-CM

## 2018-08-20 DIAGNOSIS — D72.829 ELEVATED WHITE BLOOD CELL COUNT, UNSPECIFIED: ICD-10-CM

## 2018-08-20 DIAGNOSIS — Z79.899 OTHER LONG TERM (CURRENT) DRUG THERAPY: ICD-10-CM

## 2018-08-20 DIAGNOSIS — H35.30 UNSPECIFIED MACULAR DEGENERATION: ICD-10-CM

## 2018-08-20 DIAGNOSIS — F03.90 UNSPECIFIED DEMENTIA WITHOUT BEHAVIORAL DISTURBANCE: ICD-10-CM

## 2018-08-20 DIAGNOSIS — D32.0 BENIGN NEOPLASM OF CEREBRAL MENINGES: ICD-10-CM

## 2018-08-20 DIAGNOSIS — E04.1 NONTOXIC SINGLE THYROID NODULE: ICD-10-CM

## 2018-10-11 ENCOUNTER — OUTPATIENT (OUTPATIENT)
Dept: OUTPATIENT SERVICES | Facility: HOSPITAL | Age: 83
LOS: 1 days | End: 2018-10-11
Payer: MEDICARE

## 2018-10-11 DIAGNOSIS — Z98.890 OTHER SPECIFIED POSTPROCEDURAL STATES: Chronic | ICD-10-CM

## 2018-10-11 PROBLEM — H35.30 UNSPECIFIED MACULAR DEGENERATION: Chronic | Status: ACTIVE | Noted: 2018-08-13

## 2018-10-11 PROBLEM — C50.912 MALIGNANT NEOPLASM OF UNSPECIFIED SITE OF LEFT FEMALE BREAST: Chronic | Status: ACTIVE | Noted: 2018-08-13

## 2018-10-11 PROCEDURE — 73030 X-RAY EXAM OF SHOULDER: CPT

## 2018-10-11 PROCEDURE — 73030 X-RAY EXAM OF SHOULDER: CPT | Mod: 26,LT

## 2018-10-24 ENCOUNTER — APPOINTMENT (OUTPATIENT)
Dept: OPHTHALMOLOGY | Facility: CLINIC | Age: 83
End: 2018-10-24

## 2020-03-21 ENCOUNTER — EMERGENCY (EMERGENCY)
Facility: HOSPITAL | Age: 85
LOS: 1 days | Discharge: HOME | End: 2020-03-21
Attending: EMERGENCY MEDICINE | Admitting: EMERGENCY MEDICINE
Payer: MEDICARE

## 2020-03-21 VITALS
OXYGEN SATURATION: 95 % | HEART RATE: 71 BPM | DIASTOLIC BLOOD PRESSURE: 65 MMHG | RESPIRATION RATE: 20 BRPM | TEMPERATURE: 98 F | SYSTOLIC BLOOD PRESSURE: 135 MMHG

## 2020-03-21 VITALS
SYSTOLIC BLOOD PRESSURE: 153 MMHG | DIASTOLIC BLOOD PRESSURE: 73 MMHG | HEART RATE: 62 BPM | RESPIRATION RATE: 18 BRPM | TEMPERATURE: 99 F | OXYGEN SATURATION: 96 %

## 2020-03-21 DIAGNOSIS — R05 COUGH: ICD-10-CM

## 2020-03-21 DIAGNOSIS — R06.7 SNEEZING: ICD-10-CM

## 2020-03-21 DIAGNOSIS — Z98.890 OTHER SPECIFIED POSTPROCEDURAL STATES: Chronic | ICD-10-CM

## 2020-03-21 DIAGNOSIS — R68.83 CHILLS (WITHOUT FEVER): ICD-10-CM

## 2020-03-21 LAB
ALBUMIN SERPL ELPH-MCNC: 3.8 G/DL — SIGNIFICANT CHANGE UP (ref 3.3–5)
ALP SERPL-CCNC: 81 U/L — SIGNIFICANT CHANGE UP (ref 40–120)
ALT FLD-CCNC: 8 U/L — LOW (ref 10–45)
ANION GAP SERPL CALC-SCNC: 12 MMOL/L — SIGNIFICANT CHANGE UP (ref 5–17)
APPEARANCE UR: CLEAR — SIGNIFICANT CHANGE UP
AST SERPL-CCNC: 16 U/L — SIGNIFICANT CHANGE UP (ref 10–40)
BASOPHILS # BLD AUTO: 0.12 K/UL — SIGNIFICANT CHANGE UP (ref 0–0.2)
BASOPHILS NFR BLD AUTO: 1.2 % — SIGNIFICANT CHANGE UP (ref 0–2)
BILIRUB SERPL-MCNC: 0.2 MG/DL — SIGNIFICANT CHANGE UP (ref 0.2–1.2)
BILIRUB UR-MCNC: NEGATIVE — SIGNIFICANT CHANGE UP
BUN SERPL-MCNC: 19 MG/DL — SIGNIFICANT CHANGE UP (ref 7–23)
CALCIUM SERPL-MCNC: 9.1 MG/DL — SIGNIFICANT CHANGE UP (ref 8.4–10.5)
CHLORIDE SERPL-SCNC: 101 MMOL/L — SIGNIFICANT CHANGE UP (ref 96–108)
CO2 SERPL-SCNC: 26 MMOL/L — SIGNIFICANT CHANGE UP (ref 22–31)
COLOR SPEC: YELLOW — SIGNIFICANT CHANGE UP
CREAT SERPL-MCNC: 0.67 MG/DL — SIGNIFICANT CHANGE UP (ref 0.5–1.3)
DIFF PNL FLD: ABNORMAL
EOSINOPHIL # BLD AUTO: 0.39 K/UL — SIGNIFICANT CHANGE UP (ref 0–0.5)
EOSINOPHIL NFR BLD AUTO: 3.9 % — SIGNIFICANT CHANGE UP (ref 0–6)
GLUCOSE SERPL-MCNC: 109 MG/DL — HIGH (ref 70–99)
GLUCOSE UR QL: NEGATIVE — SIGNIFICANT CHANGE UP
HCT VFR BLD CALC: 39.3 % — SIGNIFICANT CHANGE UP (ref 34.5–45)
HGB BLD-MCNC: 12.8 G/DL — SIGNIFICANT CHANGE UP (ref 11.5–15.5)
IMM GRANULOCYTES NFR BLD AUTO: 0.3 % — SIGNIFICANT CHANGE UP (ref 0–1.5)
KETONES UR-MCNC: NEGATIVE — SIGNIFICANT CHANGE UP
LACTATE SERPL-SCNC: 1.2 MMOL/L — SIGNIFICANT CHANGE UP (ref 0.5–2)
LEUKOCYTE ESTERASE UR-ACNC: ABNORMAL
LYMPHOCYTES # BLD AUTO: 2.04 K/UL — SIGNIFICANT CHANGE UP (ref 1–3.3)
LYMPHOCYTES # BLD AUTO: 20.4 % — SIGNIFICANT CHANGE UP (ref 13–44)
MCHC RBC-ENTMCNC: 31.5 PG — SIGNIFICANT CHANGE UP (ref 27–34)
MCHC RBC-ENTMCNC: 32.6 GM/DL — SIGNIFICANT CHANGE UP (ref 32–36)
MCV RBC AUTO: 96.8 FL — SIGNIFICANT CHANGE UP (ref 80–100)
MONOCYTES # BLD AUTO: 1.07 K/UL — HIGH (ref 0–0.9)
MONOCYTES NFR BLD AUTO: 10.7 % — SIGNIFICANT CHANGE UP (ref 2–14)
NEUTROPHILS # BLD AUTO: 6.34 K/UL — SIGNIFICANT CHANGE UP (ref 1.8–7.4)
NEUTROPHILS NFR BLD AUTO: 63.5 % — SIGNIFICANT CHANGE UP (ref 43–77)
NITRITE UR-MCNC: NEGATIVE — SIGNIFICANT CHANGE UP
NRBC # BLD: 0 /100 WBCS — SIGNIFICANT CHANGE UP (ref 0–0)
PH UR: 6.5 — SIGNIFICANT CHANGE UP (ref 5–8)
PLATELET # BLD AUTO: 266 K/UL — SIGNIFICANT CHANGE UP (ref 150–400)
POTASSIUM SERPL-MCNC: 4.6 MMOL/L — SIGNIFICANT CHANGE UP (ref 3.5–5.3)
POTASSIUM SERPL-SCNC: 4.6 MMOL/L — SIGNIFICANT CHANGE UP (ref 3.5–5.3)
PROT SERPL-MCNC: 7.1 G/DL — SIGNIFICANT CHANGE UP (ref 6–8.3)
PROT UR-MCNC: ABNORMAL MG/DL
RAPID RVP RESULT: SIGNIFICANT CHANGE UP
RBC # BLD: 4.06 M/UL — SIGNIFICANT CHANGE UP (ref 3.8–5.2)
RBC # FLD: 13.8 % — SIGNIFICANT CHANGE UP (ref 10.3–14.5)
SODIUM SERPL-SCNC: 139 MMOL/L — SIGNIFICANT CHANGE UP (ref 135–145)
SP GR SPEC: 1.02 — SIGNIFICANT CHANGE UP (ref 1–1.03)
UROBILINOGEN FLD QL: 0.2 E.U./DL — SIGNIFICANT CHANGE UP
WBC # BLD: 9.99 K/UL — SIGNIFICANT CHANGE UP (ref 3.8–10.5)
WBC # FLD AUTO: 9.99 K/UL — SIGNIFICANT CHANGE UP (ref 3.8–10.5)

## 2020-03-21 PROCEDURE — 80053 COMPREHEN METABOLIC PANEL: CPT

## 2020-03-21 PROCEDURE — 83605 ASSAY OF LACTIC ACID: CPT

## 2020-03-21 PROCEDURE — 99283 EMERGENCY DEPT VISIT LOW MDM: CPT | Mod: 25

## 2020-03-21 PROCEDURE — 71045 X-RAY EXAM CHEST 1 VIEW: CPT | Mod: 26

## 2020-03-21 PROCEDURE — 85025 COMPLETE CBC W/AUTO DIFF WBC: CPT

## 2020-03-21 PROCEDURE — 87086 URINE CULTURE/COLONY COUNT: CPT

## 2020-03-21 PROCEDURE — 87486 CHLMYD PNEUM DNA AMP PROBE: CPT

## 2020-03-21 PROCEDURE — 99284 EMERGENCY DEPT VISIT MOD MDM: CPT

## 2020-03-21 PROCEDURE — 87581 M.PNEUMON DNA AMP PROBE: CPT

## 2020-03-21 PROCEDURE — 71045 X-RAY EXAM CHEST 1 VIEW: CPT

## 2020-03-21 PROCEDURE — 87633 RESP VIRUS 12-25 TARGETS: CPT

## 2020-03-21 PROCEDURE — 81001 URINALYSIS AUTO W/SCOPE: CPT

## 2020-03-21 PROCEDURE — 87798 DETECT AGENT NOS DNA AMP: CPT

## 2020-03-21 PROCEDURE — 36415 COLL VENOUS BLD VENIPUNCTURE: CPT

## 2020-03-21 PROCEDURE — 87040 BLOOD CULTURE FOR BACTERIA: CPT

## 2020-03-21 NOTE — ED PROVIDER NOTE - CLINICAL SUMMARY MEDICAL DECISION MAKING FREE TEXT BOX
avss. nontoxic. NAD. no acute resp distress. reportedly at baseline per hha. no e/o sepsis. ua neg, ucx pending. rvp neg. cxr w/o interim change from prior. bcx sent. no indication for inpatient hospitalization at this time. will dc w/ outpatient pcp fu, strict return precautions.

## 2020-03-21 NOTE — ED ADULT NURSE NOTE - CHPI ED NUR SYMPTOMS NEG
no nausea/no weakness/no vomiting/no fever/no pain/no tingling/no decreased eating/drinking/no dizziness

## 2020-03-21 NOTE — ED PROVIDER NOTE - PHYSICAL EXAMINATION
CONST: elderly female nontoxic NAD speaking in full sentences although in Salvadorean (as is baseline)  HEAD: atraumatic  EYES: conjunctivae clear  ENT: mmm, oropharynx clear  NECK: supple/FROM, nttp, no jvd  CARD: rrr no murmurs  CHEST: ctab no r/r/w, no stridor/retractions/tripoding  ABD: soft, nd, nttp, no rebound/guarding, no cvat  : no rash  EXT: FROM, symmetric distal pulses intact  SKIN: warm, dry, no rash, no pedal edema, cap refill <2sec  NEURO: a+ox1, moves extremtiies spontaneously x4

## 2020-03-21 NOTE — ED ADULT TRIAGE NOTE - OTHER COMPLAINTS
CC of medical evaluation, pt is seen by the caretaker on the bed shivering and coughing. hx of dementia. denied fevers. hard of hearing

## 2020-03-21 NOTE — ED PROVIDER NOTE - PATIENT PORTAL LINK FT
You can access the FollowMyHealth Patient Portal offered by [facility] by registering at the following website: [email or web address]. By joining [employer]’s FollowMyHealth portal, you will also be able to view your health information using other applications (apps) compatible with our system.

## 2020-03-21 NOTE — ED ADULT NURSE NOTE - OBJECTIVE STATEMENT
pt a&ox3 resting comfortably in stretcher. hx of dementia. care giver at bedside. caregiver reports seeing pt in bed shivering and coughing, out of normal for pt. caregiver called ems for eval. pt denies pain, cp,sob, n,v,d, fevers. vss. no signs of respiratory distress. lungs clear b/l. pulses strong b/l. rn has not witnessed cough or shivers since pt has arrived to ed.

## 2020-03-21 NOTE — ED PROVIDER NOTE - OBJECTIVE STATEMENT
89F dementia, breast ca s/p L breast lumpectomy, age-related macular degeneration, biba from home w/ 24h hha who reports <24h dry cough/sneezing and rigors seen while in bed tonight. no fever, no somnolence, no increased wob/cp/sob, no abd pain/n/v, no dysuria, no diarrhea, no hematochezia/melena, no rash. at baseline, a+ox1, no ADLs, ambulates w/ assistance, lives at home alone w/ 24h hha.

## 2020-03-22 LAB
CULTURE RESULTS: SIGNIFICANT CHANGE UP
SPECIMEN SOURCE: SIGNIFICANT CHANGE UP

## 2020-03-26 LAB
CULTURE RESULTS: SIGNIFICANT CHANGE UP
CULTURE RESULTS: SIGNIFICANT CHANGE UP
SPECIMEN SOURCE: SIGNIFICANT CHANGE UP
SPECIMEN SOURCE: SIGNIFICANT CHANGE UP

## 2020-08-25 NOTE — H&P ADULT - PROBLEM SELECTOR PLAN 4
Complains of urinary incontinence worsening over past few weeks, denies dysuria.  - f/u UA, if + for UTI will treat  - evaluate for NPH as above Complains of urinary incontinence worsening over past few weeks, denies dysuria.  - f/u UA, if + for UTI will treat  - evaluate for NPH as above  - bladder scan to r/o overflow incontinence Bed in low position, brakes on/Orientation to room/Use of non-skid footwear for ambulating patients, use of appropriate size clothing to prevent risk of tripping/Side rails x 2 or 4 up, assess large gaps, such that a patient could get extremity or other body part entrapped, use additional safety procedures

## 2022-10-12 NOTE — PHYSICAL THERAPY INITIAL EVALUATION ADULT - PLANNED THERAPY INTERVENTIONS, PT EVAL
bed mobility training/strengthening/transfer training/gait training/balance training Tremfya Counseling: I discussed with the patient the risks of guselkumab including but not limited to immunosuppression, serious infections, worsening of inflammatory bowel disease and drug reactions.  The patient understands that monitoring is required including a PPD at baseline and must alert us or the primary physician if symptoms of infection or other concerning signs are noted.